# Patient Record
Sex: FEMALE | Race: WHITE | Employment: FULL TIME | URBAN - METROPOLITAN AREA
[De-identification: names, ages, dates, MRNs, and addresses within clinical notes are randomized per-mention and may not be internally consistent; named-entity substitution may affect disease eponyms.]

---

## 2017-04-04 ENCOUNTER — HOSPITAL ENCOUNTER (OUTPATIENT)
Dept: OTHER | Age: 37
Discharge: HOME | End: 2017-04-04
Attending: FAMILY MEDICINE | Admitting: FAMILY MEDICINE

## 2017-04-04 LAB
A/G RATIO: 1.4 (ref 1.1–2.2)
ALBUMIN SERPL-MCNC: 4.3 G/DL (ref 3.4–5)
ALP BLD-CCNC: 62 U/L (ref 40–129)
ALT SERPL-CCNC: 10 U/L (ref 10–40)
ANION GAP SERPL CALCULATED.3IONS-SCNC: 15 MMOL/L (ref 3–16)
AST SERPL-CCNC: 16 U/L (ref 15–37)
BASOPHILS ABSOLUTE: 0.1 K/UL (ref 0–0.2)
BASOPHILS RELATIVE PERCENT: 1.4 %
BILIRUB SERPL-MCNC: <0.2 MG/DL (ref 0–1)
BUN BLDV-MCNC: 6 MG/DL (ref 7–20)
CALCIUM SERPL-MCNC: 9.1 MG/DL (ref 8.3–10.6)
CHLORIDE BLD-SCNC: 102 MMOL/L (ref 99–110)
CO2: 23 MMOL/L (ref 21–32)
CREAT SERPL-MCNC: 0.6 MG/DL (ref 0.6–1.1)
EOSINOPHILS ABSOLUTE: 0.2 K/UL (ref 0–0.6)
EOSINOPHILS RELATIVE PERCENT: 2.3 %
GFR AFRICAN AMERICAN: >60
GFR NON-AFRICAN AMERICAN: >60
GLOBULIN: 3.1 G/DL
GLUCOSE BLD-MCNC: 109 MG/DL (ref 70–99)
HBV SURFACE AB TITR SER: <3.5 MUL/ML
HCT VFR BLD CALC: 42.4 % (ref 36–48)
HEMOGLOBIN: 14 G/DL (ref 12–16)
HEPATITIS B SURFACE ANTIGEN INTERPRETATION: NORMAL
HEPATITIS C ANTIBODY INTERPRETATION: NORMAL
LYMPHOCYTES ABSOLUTE: 2.5 K/UL (ref 1–5.1)
LYMPHOCYTES RELATIVE PERCENT: 24.9 %
MCH RBC QN AUTO: 27.4 PG (ref 26–34)
MCHC RBC AUTO-ENTMCNC: 33 G/DL (ref 31–36)
MCV RBC AUTO: 83 FL (ref 80–100)
MONOCYTES ABSOLUTE: 0.7 K/UL (ref 0–1.3)
MONOCYTES RELATIVE PERCENT: 7 %
NEUTROPHILS ABSOLUTE: 6.4 K/UL (ref 1.7–7.7)
NEUTROPHILS RELATIVE PERCENT: 64.4 %
PDW BLD-RTO: 14.6 % (ref 12.4–15.4)
PLATELET # BLD: 248 K/UL (ref 135–450)
PMV BLD AUTO: 8.8 FL (ref 5–10.5)
POTASSIUM SERPL-SCNC: 3.8 MMOL/L (ref 3.5–5.1)
RBC # BLD: 5.11 M/UL (ref 4–5.2)
SODIUM BLD-SCNC: 140 MMOL/L (ref 136–145)
TOTAL PROTEIN: 7.4 G/DL (ref 6.4–8.2)
TSH SERPL DL<=0.05 MIU/L-ACNC: 1.12 UIU/ML (ref 0.27–4.2)
WBC # BLD: 9.9 K/UL (ref 4–11)

## 2017-04-05 LAB
HIV-1 AND HIV-2 ANTIBODIES: NORMAL
RPR: NORMAL

## 2021-11-28 ENCOUNTER — HOSPITAL ENCOUNTER (EMERGENCY)
Age: 41
Discharge: HOME | End: 2021-11-28
Attending: STUDENT IN AN ORGANIZED HEALTH CARE EDUCATION/TRAINING PROGRAM
Payer: COMMERCIAL

## 2021-11-28 VITALS
SYSTOLIC BLOOD PRESSURE: 151 MMHG | OXYGEN SATURATION: 98 % | HEIGHT: 65 IN | RESPIRATION RATE: 16 BRPM | DIASTOLIC BLOOD PRESSURE: 94 MMHG | WEIGHT: 220 LBS | BODY MASS INDEX: 36.65 KG/M2 | HEART RATE: 75 BPM | TEMPERATURE: 97.5 F

## 2021-11-28 DIAGNOSIS — L08.9 CHRONIC WOUND INFECTION OF ABDOMEN, INITIAL ENCOUNTER: Primary | ICD-10-CM

## 2021-11-28 DIAGNOSIS — S31.109A CHRONIC WOUND INFECTION OF ABDOMEN, INITIAL ENCOUNTER: Primary | ICD-10-CM

## 2021-11-28 PROCEDURE — 99283 EMERGENCY DEPT VISIT LOW MDM: CPT

## 2021-11-28 RX ORDER — ONDANSETRON 4 MG/1
4 TABLET, ORALLY DISINTEGRATING ORAL 3 TIMES DAILY PRN
Qty: 21 TABLET | Refills: 0 | Status: SHIPPED | OUTPATIENT
Start: 2021-11-28

## 2021-11-28 RX ORDER — CEPHALEXIN 250 MG/1
500 CAPSULE ORAL 4 TIMES DAILY
Qty: 56 CAPSULE | Refills: 0 | Status: SHIPPED | OUTPATIENT
Start: 2021-11-28 | End: 2021-12-05

## 2021-11-28 NOTE — Clinical Note
Magan Garcia was seen and treated in our emergency department on 11/28/2021. She may return to work on 11/29/2021. If you have any questions or concerns, please don't hesitate to call.       Marvin Swift, DO

## 2021-11-28 NOTE — ED PROVIDER NOTES
[address] ED      CHIEF COMPLAINT  Wound Check (patient reports having a small opening on her  scar. )       HISTORY OF PRESENT ILLNESS  Jill Mendoza is a 37 y.o. female  who presents to the ED complaining of opening of her  scar. Patient states that she had an opening of the scar a couple weeks ago and was going to follow with wound care, however it closed up and she never followed up. She was diagnosed with a UTI last week and is currently taking Macrobid. She states over the last few days the  scar (last operation 14 years ago) has opened up again. She believes there was some drainage from it this morning. She has minimal pain in the area. Has not had a fever since last week. She has had some nausea and an episode of emesis yesterday. No chest pain, no shortness of breath no abdominal pain. No other complaints, modifying factors or associated symptoms. I have reviewed the following from the nursing documentation. Past Medical History:   Diagnosis Date    Anxiety     GERD (gastroesophageal reflux disease)     Leg pain, bilateral     Migraine     Overweight      Past Surgical History:   Procedure Laterality Date     SECTION      x 5    OTHER SURGICAL HISTORY  2014    laparoscopic cholecystectomy    TUBAL LIGATION      WISDOM TOOTH EXTRACTION       No family history on file.   Social History     Socioeconomic History    Marital status:      Spouse name: Holly Esvin Number of children: 11    Years of education: Not on file    Highest education level: Not on file   Occupational History    Not on file   Tobacco Use    Smoking status: Current Every Day Smoker     Packs/day: 1.00     Years: 19.00     Pack years: 19.00     Types: Cigarettes    Smokeless tobacco: Never Used   Substance and Sexual Activity    Alcohol use: Yes     Comment: occasional    Drug use: No    Sexual activity: Yes     Partners: Male     Comment: no birthcontrol   Other Topics Concern    Not on file   Social History Narrative    Not on file     Social Determinants of Health     Financial Resource Strain:     Difficulty of Paying Living Expenses: Not on file   Food Insecurity:     Worried About Running Out of Food in the Last Year: Not on file    Jacey of Food in the Last Year: Not on file   Transportation Needs:     Lack of Transportation (Medical): Not on file    Lack of Transportation (Non-Medical): Not on file   Physical Activity:     Days of Exercise per Week: Not on file    Minutes of Exercise per Session: Not on file   Stress:     Feeling of Stress : Not on file   Social Connections:     Frequency of Communication with Friends and Family: Not on file    Frequency of Social Gatherings with Friends and Family: Not on file    Attends Mormonism Services: Not on file    Active Member of [address] [place] [address] [employer] or Organizations: Not on file    Attends Club or Organization Meetings: Not on file    Marital Status: Not on file   Intimate Partner Violence:     Fear of Current or Ex-Partner: Not on file    Emotionally Abused: Not on file    Physically Abused: Not on file    Sexually Abused: Not on file   Housing Stability:     Unable to Pay for Housing in the Last Year: Not on file    Number of [place] in the Last Year: Not on file    Unstable Housing in the Last Year: Not on file     No current facility-administered medications for this encounter.      Current Outpatient Medications   Medication Sig Dispense Refill    cephALEXin (KEFLEX) 250 MG capsule Take 2 capsules by mouth 4 times daily for 7 days 56 capsule 0    ondansetron (ZOFRAN-ODT) 4 MG disintegrating tablet Take 1 tablet by mouth 3 times daily as needed for Nausea or Vomiting 21 tablet 0    sulfamethoxazole-trimethoprim (BACTRIM DS) 800-160 MG per tablet Take 1 tablet by mouth 2 times daily      HYDROcodone-acetaminophen (NORCO) 5-325 MG per tablet Take 1 tablet by mouth every 6 hours as needed for Pain 6 tablet 0     Allergies   Allergen Reactions    Toradol [Ketorolac Tromethamine] Other (See Comments)     \"restless legs\"       REVIEW OF SYSTEMS  10 systems reviewed, pertinent positives per HPI otherwise noted to be negative. PHYSICAL EXAM  BP (!) 151/94   Pulse 78   Temp 97.5 °F (36.4 °C) (Temporal)   Resp 18   Ht 5' 5\" (1.651 m)   Wt 220 lb (99.8 kg)   SpO2 98%   BMI 36.61 kg/m²    General: Appears well. Alert  HEENT: Head atraumatic, Eyes normal inspection, PERRL. Normal ENT inspection, Pharynx normal. No signs of dehydration  NECK: Normal inspection  RESPIRATORY: Normal breath sounds. No chest wall tenderness. No respiratory distress  CVS: Heart rate and rhythm regular. No Murmurs  ABDOMEN/GI: Soft, Non-tender, No distention. Lower abdominal horizontal  scar with 2 mm annular opening on the left lateral aspect. There is granulation tissue within the wound, no active bleeding, no drainage, minimal tenderness. Mild erythema. BACK: Normal inspection  EXTREMITIES: Non-Tender. Full ROM. Normal appearance. No Pedal edema  NEURO: Alert and oriented. Sensation normal. Motor normal  PSYCH: Mood normal. Affect normal.  SKIN: Color normal. No rash. Warm, Dry    ED COURSE/MDM  Patient seen and evaluated. Old records reviewed. Labs and imaging reviewed and results discussed with patient. Presented with opening of chronic scar. No signs of severe wound infection, however due to surrounding erythema, will cover with Keflex. Currently on Macrobid for UTI, which we will stop in favor of Keflex to cover both urine and possible skin structure infection. She is given strict return precautions for fevers, severe pain, nausea and vomiting not respond to Zofran, or other concerning symptoms. She will follow with her PCP and wound care. During the patient's ED course, the patient was given:  Medications - No data to display     CLINICAL IMPRESSION  1.  Chronic wound infection of abdomen, initial encounter        Blood pressure (!) 151/94, pulse 78, temperature 97.5 °F (36.4 °C), temperature source Temporal, resp. rate 18, height 5' 5\" (1.651 m), weight 220 lb (99.8 kg), SpO2 98 %, not currently breastfeeding. Ladlynette Culver was discharged to home in good condition. Patient was given scripts for the following medications. I counseled patient how to take these medications. New Prescriptions    CEPHALEXIN (KEFLEX) 250 MG CAPSULE    Take 2 capsules by mouth 4 times daily for 7 days    ONDANSETRON (ZOFRAN-ODT) 4 MG DISINTEGRATING TABLET    Take 1 tablet by mouth 3 times daily as needed for Nausea or Vomiting       Follow-up with:  Lilia Guillen MD  [address] [place] [address] [address] [facility]  [phone]    In 3 days  As needed      DISCLAIMER: This chart was created using Dragon dictation software. Efforts were made by me to ensure accuracy, however some errors may be present due to limitations of this technology and occasionally words are not transcribed correctly.        Moses Centeno DO  11/28/21 [id number]

## 2022-05-10 ENCOUNTER — HOSPITAL ENCOUNTER (EMERGENCY)
Age: 42
Discharge: HOME | End: 2022-05-10
Attending: EMERGENCY MEDICINE
Payer: COMMERCIAL

## 2022-05-10 VITALS
WEIGHT: 250 LBS | TEMPERATURE: 97.1 F | OXYGEN SATURATION: 98 % | HEART RATE: 76 BPM | BODY MASS INDEX: 41.65 KG/M2 | SYSTOLIC BLOOD PRESSURE: 118 MMHG | HEIGHT: 65 IN | DIASTOLIC BLOOD PRESSURE: 81 MMHG | RESPIRATION RATE: 16 BRPM

## 2022-05-10 DIAGNOSIS — L50.9 URTICARIA: Primary | ICD-10-CM

## 2022-05-10 PROCEDURE — 96375 TX/PRO/DX INJ NEW DRUG ADDON: CPT

## 2022-05-10 PROCEDURE — 96374 THER/PROPH/DIAG INJ IV PUSH: CPT

## 2022-05-10 PROCEDURE — 2580000003 HC RX 258: Performed by: EMERGENCY MEDICINE

## 2022-05-10 PROCEDURE — 2500000003 HC RX 250 WO HCPCS: Performed by: EMERGENCY MEDICINE

## 2022-05-10 PROCEDURE — 6360000002 HC RX W HCPCS: Performed by: EMERGENCY MEDICINE

## 2022-05-10 PROCEDURE — 99284 EMERGENCY DEPT VISIT MOD MDM: CPT

## 2022-05-10 RX ORDER — FLUTICASONE PROPIONATE AND SALMETEROL 50; 250 UG/1; UG/1
POWDER RESPIRATORY (INHALATION)
COMMUNITY
Start: 2022-05-09

## 2022-05-10 RX ORDER — CYCLOBENZAPRINE HCL 5 MG
TABLET ORAL
COMMUNITY
Start: 2022-05-09

## 2022-05-10 RX ORDER — METHYLPREDNISOLONE 4 MG/1
TABLET ORAL
Qty: 1 KIT | Refills: 0 | Status: SHIPPED | OUTPATIENT
Start: 2022-05-10 | End: 2022-05-16

## 2022-05-10 RX ORDER — FAMOTIDINE 20 MG/1
20 TABLET, FILM COATED ORAL 2 TIMES DAILY
Qty: 14 TABLET | Refills: 0 | Status: SHIPPED | OUTPATIENT
Start: 2022-05-10 | End: 2022-05-17

## 2022-05-10 RX ORDER — BUPROPION HYDROCHLORIDE 150 MG/1
TABLET ORAL
COMMUNITY
Start: 2022-05-09

## 2022-05-10 RX ORDER — METHYLPREDNISOLONE SODIUM SUCCINATE 125 MG/2ML
125 INJECTION, POWDER, LYOPHILIZED, FOR SOLUTION INTRAMUSCULAR; INTRAVENOUS ONCE
Status: COMPLETED | OUTPATIENT
Start: 2022-05-10 | End: 2022-05-10

## 2022-05-10 RX ORDER — NICOTINE 21 MG/24HR
PATCH, TRANSDERMAL 24 HOURS TRANSDERMAL
COMMUNITY
Start: 2022-05-09

## 2022-05-10 RX ADMIN — METHYLPREDNISOLONE SODIUM SUCCINATE 125 MG: 125 INJECTION, POWDER, FOR SOLUTION INTRAMUSCULAR; INTRAVENOUS at 10:47

## 2022-05-10 RX ADMIN — FAMOTIDINE 20 MG: 10 INJECTION INTRAVENOUS at 10:47

## 2022-05-10 ASSESSMENT — PAIN - FUNCTIONAL ASSESSMENT: PAIN_FUNCTIONAL_ASSESSMENT: NONE - DENIES PAIN

## 2022-05-10 NOTE — Clinical Note
Larisa Haque was seen and treated in our emergency department on 5/10/2022. She may return to work on 05/11/2022. If you have any questions or concerns, please don't hesitate to call.       Rom Pierce MD

## 2022-05-10 NOTE — ED NOTES
Patient discharged in stable, ambulatory condition with all documented belongings. This nurse reviewed discharge instructions, pt verbalized understanding.        Umesh Guillen RN  05/10/22 [id number]

## 2022-05-13 ENCOUNTER — HOSPITAL ENCOUNTER (EMERGENCY)
Age: 42
Discharge: HOME | End: 2022-05-13
Attending: EMERGENCY MEDICINE
Payer: COMMERCIAL

## 2022-05-13 VITALS
BODY MASS INDEX: 42.68 KG/M2 | DIASTOLIC BLOOD PRESSURE: 85 MMHG | TEMPERATURE: 97.2 F | SYSTOLIC BLOOD PRESSURE: 135 MMHG | WEIGHT: 250 LBS | OXYGEN SATURATION: 99 % | HEIGHT: 64 IN | RESPIRATION RATE: 18 BRPM | HEART RATE: 95 BPM

## 2022-05-13 DIAGNOSIS — L50.9 URTICARIA: Primary | ICD-10-CM

## 2022-05-13 PROCEDURE — 99283 EMERGENCY DEPT VISIT LOW MDM: CPT

## 2022-05-13 RX ORDER — EPINEPHRINE 0.3 MG/.3ML
0.3 INJECTION SUBCUTANEOUS ONCE
Qty: 0.3 ML | Refills: 0 | Status: SHIPPED | OUTPATIENT
Start: 2022-05-13 | End: 2022-05-13

## 2022-05-13 RX ORDER — CAMPHOR 0.45 %
10 GEL (GRAM) TOPICAL 3 TIMES DAILY
Qty: 118 ML | Refills: 0 | Status: SHIPPED | OUTPATIENT
Start: 2022-05-13

## 2022-05-13 ASSESSMENT — PAIN DESCRIPTION - DESCRIPTORS: DESCRIPTORS: PRESSURE

## 2022-05-13 ASSESSMENT — PAIN DESCRIPTION - PAIN TYPE: TYPE: ACUTE PAIN

## 2022-05-13 ASSESSMENT — PAIN DESCRIPTION - FREQUENCY: FREQUENCY: CONTINUOUS

## 2022-05-13 ASSESSMENT — PAIN SCALES - GENERAL: PAINLEVEL_OUTOF10: 4

## 2022-05-13 ASSESSMENT — PAIN DESCRIPTION - LOCATION: LOCATION: NECK

## 2022-05-13 NOTE — Clinical Note
Gracia Trevizo was seen and treated in our emergency department on 5/13/2022. She may return to work on 05/14/2022. If you have any questions or concerns, please don't hesitate to call.       Pebbles Dacosta MD

## 2022-05-13 NOTE — ED PROVIDER NOTES
CHIEF COMPLAINT  Urticaria (Pt. tates she has had hives since Tuesday. Pt. has been seen by her PCP. )      HISTORY OF PRESENT ILLNESS  Roosevelt Crespo is a 37 y.o. female with a history of migraines, obesity, GERD presents emerged department for evaluation of rash, urticaria. She states that she was recently seen in the emergency department was started on steroids. She states that she was also seen by her primary care doctor yesterday who increased the steroid dose however she has had persistent urticaria. The rash involves the extremities, trunk, back and are very itchy. She has been taking the steroids as directed as well as antihistamines. She denies any difficulty breathing, throat closing. Denies prior issue with hives or known allergies or exposures. Past Medical History:   Diagnosis Date    Anxiety     GERD (gastroesophageal reflux disease)     Leg pain, bilateral     Migraine     Overweight      Past Surgical History:   Procedure Laterality Date     SECTION      x 5    OTHER SURGICAL HISTORY  2014    laparoscopic cholecystectomy    TUBAL LIGATION      WISDOM TOOTH EXTRACTION       No family history on file.   Social History     Socioeconomic History    Marital status:      Spouse name: Juan Jose Xiao Number of children: 11    Years of education: Not on file    Highest education level: Not on file   Occupational History    Not on file   Tobacco Use    Smoking status: Current Every Day Smoker     Packs/day: 1.50     Years: 19.00     Pack years: 28.50     Types: Cigarettes    Smokeless tobacco: Never Used   Substance and Sexual Activity    Alcohol use: Not Currently     Comment: occasional    Drug use: No    Sexual activity: Yes     Partners: Male     Comment: no birthcontrol   Other Topics Concern    Not on file   Social History Narrative    Not on file     Social Determinants of Health     Financial Resource Strain:     Difficulty of Paying Living Expenses: Not on file   Food Insecurity:     Worried About Running Out of Food in the Last Year: Not on file    Jacey of Food in the Last Year: Not on file   Transportation Needs:     Lack of Transportation (Medical): Not on file    Lack of Transportation (Non-Medical): Not on file   Physical Activity:     Days of Exercise per Week: Not on file    Minutes of Exercise per Session: Not on file   Stress:     Feeling of Stress : Not on file   Social Connections:     Frequency of Communication with Friends and Family: Not on file    Frequency of Social Gatherings with Friends and Family: Not on file    Attends Hinduism Services: Not on file    Active Member of [address] [place] [address] [employer] or Organizations: Not on file    Attends Club or Organization Meetings: Not on file    Marital Status: Not on file   Intimate Partner Violence:     Fear of Current or Ex-Partner: Not on file    Emotionally Abused: Not on file    Physically Abused: Not on file    Sexually Abused: Not on file   Housing Stability:     Unable to Pay for Housing in the Last Year: Not on file    Number of [place] in the Last Year: Not on file    Unstable Housing in the Last Year: Not on file     No current facility-administered medications for this encounter. Current Outpatient Medications   Medication Sig Dispense Refill    diphenhydrAMINE HCl, TOPICAL, (BENADRYL ITCH STOPPING) 2 % GEL Apply 10 mLs topically in the morning, at noon, and at bedtime 118 mL 0    EPINEPHrine (EPIPEN 2-LARRY) 0.3 MG/0.3ML SOAJ injection Inject 0.3 mLs into the muscle once for 1 dose Use as directed for allergic reaction 0.3 mL 0    nicotine (NICODERM CQ) 21 MG/24HR       VENTOLIN  (90 Base) MCG/ACT inhaler       buPROPion (WELLBUTRIN XL) 150 MG extended release tablet       cyclobenzaprine (FLEXERIL) 5 MG tablet       ADVAIR DISKUS 250-50 MCG/ACT AEPB diskus inhaler       methylPREDNISolone (MEDROL DOSEPACK) 4 MG tablet Take by mouth.  1 kit 0    famotidine (PEPCID) 20 MG tablet Take 1 tablet by mouth 2 times daily for 7 days 14 tablet 0    ondansetron (ZOFRAN-ODT) 4 MG disintegrating tablet Take 1 tablet by mouth 3 times daily as needed for Nausea or Vomiting 21 tablet 0    sulfamethoxazole-trimethoprim (BACTRIM DS) 800-160 MG per tablet Take 1 tablet by mouth 2 times daily      HYDROcodone-acetaminophen (NORCO) 5-325 MG per tablet Take 1 tablet by mouth every 6 hours as needed for Pain 6 tablet 0     Allergies   Allergen Reactions    Toradol [Ketorolac Tromethamine] Other (See Comments)     \"restless legs\"       REVIEW OF SYSTEMS  Positive and pertinent negatives as per HPI. All other systems were reviewed and are negative. PHYSICAL EXAM  /85   Pulse 95   Temp 97.2 °F (36.2 °C) (Tympanic)   Resp 18   Ht 5' 4\" (1.626 m)   Wt 250 lb (113.4 kg)   LMP 04/15/2022 (Approximate)   SpO2 99%   BMI 42.91 kg/m²   GENERAL APPEARANCE: Awake and alert. Cooperative. HEAD: Normocephalic. Atraumatic. There is no significant posterior oropharyngeal swelling. LUNGS: Respirations unlabored without accessory muscle use. CTAB. Good air exchange. No wheezes, rales, or rhonchi. Speaking comfortably in full sentences. ABDOMEN: Soft. Non-distended. Non-tender. No guarding or rebound. EXTREMITIES: No peripheral edema. No acute deformities. SKIN: Warm and dry scattered hives the bilateral upper extremities, trunk. NEUROLOGICAL: Alert and oriented X 3. No focal deficit  LABS  I have reviewed all labs for this visit. No results found for this visit on 05/13/22. RADIOLOGY  X-RAYS:  I have reviewed radiologic plain film image(s). ALL OTHER NON-PLAIN FILM IMAGES SUCH AS CT, ULTRASOUND AND MRI HAVE BEEN READ BY THE RADIOLOGIST. No orders to display          No results found. ED COURSE/MDM  Patient seen and evaluated. Old records reviewed. Labs and imaging reviewed and results discussed with patient.      41-year-old female presenting for persistent urticaria despite related to that system including errors in grammar, punctuation, and spelling, as well as words and phrases that may be inappropriate. If there are any questions or concerns please feel free to contact the dictating provider for clarification.      Yariel Reyes MD  05/13/22 [id number]

## 2022-06-06 ENCOUNTER — HOSPITAL ENCOUNTER (OUTPATIENT)
Dept: PULMONOLOGY | Age: 42
Discharge: HOME | End: 2022-06-06
Payer: COMMERCIAL

## 2022-06-06 VITALS — OXYGEN SATURATION: 99 %

## 2022-06-06 DIAGNOSIS — R06.2 WHEEZING ON EXPIRATION: ICD-10-CM

## 2022-06-06 LAB
EXPIRATORY TIME-POST: NORMAL
EXPIRATORY TIME: NORMAL
FEF 25-75% %CHNG: NORMAL
FEF 25-75% %PRED-POST: NORMAL
FEF 25-75% %PRED-PRE: NORMAL
FEF 25-75% PRED: NORMAL
FEF 25-75%-POST: NORMAL
FEF 25-75%-PRE: NORMAL
FEV1 %PRED-POST: 95 %
FEV1 %PRED-PRE: 92 %
FEV1 PRED: NORMAL
FEV1-POST: NORMAL
FEV1-PRE: NORMAL
FEV1/FVC %PRED-POST: NORMAL
FEV1/FVC %PRED-PRE: NORMAL
FEV1/FVC PRED: NORMAL
FEV1/FVC-POST: 82 %
FEV1/FVC-PRE: 81 %
FVC %PRED-POST: NORMAL
FVC %PRED-PRE: NORMAL
FVC PRED: NORMAL
FVC-POST: NORMAL
FVC-PRE: NORMAL
PEF %PRED-POST: NORMAL
PEF %PRED-PRE: NORMAL
PEF PRED: NORMAL
PEF%CHNG: NORMAL
PEF-POST: NORMAL
PEF-PRE: NORMAL

## 2022-06-06 PROCEDURE — 94760 N-INVAS EAR/PLS OXIMETRY 1: CPT

## 2022-06-06 PROCEDURE — 94060 EVALUATION OF WHEEZING: CPT

## 2022-06-06 PROCEDURE — 6370000000 HC RX 637 (ALT 250 FOR IP): Performed by: NURSE PRACTITIONER

## 2022-06-06 PROCEDURE — 94640 AIRWAY INHALATION TREATMENT: CPT

## 2022-06-06 RX ORDER — ALBUTEROL SULFATE 90 UG/1
4 AEROSOL, METERED RESPIRATORY (INHALATION) ONCE
Status: COMPLETED | OUTPATIENT
Start: 2022-06-06 | End: 2022-06-06

## 2022-06-06 RX ADMIN — Medication 4 PUFF: at 08:02

## 2022-06-06 ASSESSMENT — PULMONARY FUNCTION TESTS
FEV1/FVC_POST: 82
FEV1/FVC_PRE: 81
FEV1_PERCENT_PREDICTED_PRE: 92
FEV1_PERCENT_PREDICTED_POST: 95

## 2022-06-07 NOTE — PROCEDURES
[address]                 [address] [place] [address]                               PULMONARY FUNCTION    PATIENT NAME: Ambrosio Prajapati                    :        1980  MED REC NO:   [id number]                          ROOM:  ACCOUNT NO:   [de-identified]                           ADMIT DATE: 2022  PROVIDER:     Desmond Dewitt MD    DATE OF PROCEDURE:  2022    FINDINGS:  Spirometry: The FEV1 is 2.76 liters, which is 92% of  predicted. FVC is 3.43 liters, which is 93% of predicted. The FEV1/FVC  ratio was normal.  Inhaled bronchodilators were given. There is no  significant improvement. IMPRESSION:  No obstructive lung defect.         Panda William MD    D: 2022 9:47:05       T: 2022 11:08:34     [id number]  Job#: [id number]     Doc#: [id number]    CC:

## 2022-09-14 ENCOUNTER — HOSPITAL ENCOUNTER (EMERGENCY)
Age: 42
Discharge: LEFT BEFORE DISCHARGE | End: 2022-09-14
Attending: STUDENT IN AN ORGANIZED HEALTH CARE EDUCATION/TRAINING PROGRAM
Payer: COMMERCIAL

## 2022-09-14 VITALS
TEMPERATURE: 97.2 F | SYSTOLIC BLOOD PRESSURE: 163 MMHG | BODY MASS INDEX: 44.98 KG/M2 | HEIGHT: 65 IN | RESPIRATION RATE: 12 BRPM | DIASTOLIC BLOOD PRESSURE: 98 MMHG | HEART RATE: 81 BPM | WEIGHT: 270 LBS | OXYGEN SATURATION: 99 %

## 2022-09-14 DIAGNOSIS — R10.30 LOWER ABDOMINAL PAIN: Primary | ICD-10-CM

## 2022-09-14 DIAGNOSIS — Z53.29 LEFT AGAINST MEDICAL ADVICE: ICD-10-CM

## 2022-09-14 DIAGNOSIS — R11.0 NAUSEA: ICD-10-CM

## 2022-09-14 PROCEDURE — 99281 EMR DPT VST MAYX REQ PHY/QHP: CPT

## 2022-09-14 NOTE — DISCHARGE INSTRUCTIONS
You declined any work-up here in the emergency department. Please return the emergency department immediately if you have new or worsening symptoms or change your mind about evaluation.
No

## 2022-09-14 NOTE — ED PROVIDER NOTES
[address] ED      CHIEF COMPLAINT  Abdominal pain        HISTORY OF PRESENT ILLNESS  Arron Crowder is a 40 y.o. female with past medical history of GERD, migraine, anxiety, and bile leak who presents to the ED complaining of lower abdominal pain. Onset of pain: 1hr ago, onset at rest  Location: lower quadrants  Radiation: denies  Quality: stabbing  Severity: resolved  Timing: resolved  Palliative Factors: denies  Provocative Factors: denies    Nausea/Vomiting: nausea without vomtiing  Diarrhea/Constipation: constipation; Last BM: today  Vaginal Discharge/Bleeding/: denies Last menses:   Dysuria/urinary frequency: denies  Fever: denies  Previous abdominal surgeries: c section, tubal ligation, cholecystectomy      Old records reviewed: No pertinent information noted. No other complaints, modifying factors or associated symptoms. I have reviewed the following from the nursing documentation. Past Medical History:   Diagnosis Date    Anxiety     GERD (gastroesophageal reflux disease)     Leg pain, bilateral     Migraine     Overweight      Past Surgical History:   Procedure Laterality Date     SECTION      x 5    OTHER SURGICAL HISTORY  2014    laparoscopic cholecystectomy    TUBAL LIGATION      WISDOM TOOTH EXTRACTION       No family history on file.   Social History     Socioeconomic History    Marital status:      Spouse name: Max Madera    Number of children: 5    Years of education: Not on file    Highest education level: Not on file   Occupational History    Not on file   Tobacco Use    Smoking status: Every Day     Packs/day: 1.50     Years: 19.00     Pack years: 28.50     Types: Cigarettes    Smokeless tobacco: Never   Substance and Sexual Activity    Alcohol use: Not Currently     Comment: occasional    Drug use: No    Sexual activity: Yes     Partners: Male     Comment: no birthcontrol   Other Topics Concern    Not on file   Social History Narrative    Not on file Social Determinants of Health     Financial Resource Strain: Not on file   Food Insecurity: Not on file   Transportation Needs: Not on file   Physical Activity: Not on file   Stress: Not on file   Social Connections: Not on file   Intimate Partner Violence: Not on file   Housing Stability: Not on file     No current facility-administered medications for this encounter. Current Outpatient Medications   Medication Sig Dispense Refill    diphenhydrAMINE HCl, TOPICAL, (BENADRYL ITCH STOPPING) 2 % GEL Apply 10 mLs topically in the morning, at noon, and at bedtime 118 mL 0    EPINEPHrine (EPIPEN 2-LARRY) 0.3 MG/0.3ML SOAJ injection Inject 0.3 mLs into the muscle once for 1 dose Use as directed for allergic reaction 0.3 mL 0    nicotine (NICODERM CQ) 21 MG/24HR       VENTOLIN  (90 Base) MCG/ACT inhaler       buPROPion (WELLBUTRIN XL) 150 MG extended release tablet       cyclobenzaprine (FLEXERIL) 5 MG tablet       ADVAIR DISKUS 250-50 MCG/ACT AEPB diskus inhaler       famotidine (PEPCID) 20 MG tablet Take 1 tablet by mouth 2 times daily for 7 days 14 tablet 0    ondansetron (ZOFRAN-ODT) 4 MG disintegrating tablet Take 1 tablet by mouth 3 times daily as needed for Nausea or Vomiting 21 tablet 0    sulfamethoxazole-trimethoprim (BACTRIM DS) 800-160 MG per tablet Take 1 tablet by mouth 2 times daily      HYDROcodone-acetaminophen (NORCO) 5-325 MG per tablet Take 1 tablet by mouth every 6 hours as needed for Pain 6 tablet 0     Allergies   Allergen Reactions    Toradol [Ketorolac Tromethamine] Other (See Comments)     \"restless legs\"       REVIEW OF SYSTEMS  All systems reviewed, pertinent positives per HPI otherwise noted to be negative. PHYSICAL EXAM  BP (!) 163/98   Pulse 81   Temp 97.2 °F (36.2 °C) (Oral)   Resp 12   Ht 5' 5\" (1.651 m)   Wt 270 lb (122.5 kg)   SpO2 99%   BMI 44.93 kg/m²    GENERAL APPEARANCE: Awake and alert. Cooperative. no distress. HENT: Normocephalic. Atraumatic.  Mucous membranes are moist  NECK: Supple. Full range of motion without pain or stiffness  EYES: PERRL. EOM's grossly intact. HEART/CHEST: RRR. No murmurs. LUNGS: Respirations unlabored. CTAB. Good air exchange. Speaking comfortably in full sentences. ABDOMEN: mildly tender diffusely. Soft. Non-distended. No masses. No organomegaly. No guarding or rebound. MUSCULOSKELETAL: No extremity edema. Compartments soft. No deformity. No tenderness in the extremities. All extremities neurovascularly intact. SKIN: Warm and dry. No acute rashes. NEUROLOGICAL: Alert and oriented. No gross facial drooping. Strength 5/5, sensation intact. PSYCHIATRIC: Normal mood and affect. LABS  I have reviewed all labs for this visit. No results found for this visit on 09/14/22. RADIOLOGY  No orders to display           ED COURSE / MDM  Patient seen and evaluated. Old records reviewed and pertinent information included in HPI. Labs and imaging reviewed and results discussed with patient. Overall, well appearing patient in no distress, presenting for abdominal pain. Physical exam remarkable for mild diffuse abdominal tenderness. Differential diagnosis includes but is not limited to: Appendicitis, bowel obstruction,  diverticulitis, hernia, UTI, AAA, pregnancy, ectopic pregnancy, ovarian torsion, tubo-ovarian abscess. Lower suspicion for gastroenteritis, peptic ulcer disease, cholecystitis, pancreatitis, hepatitis or other liver disease    During the patient's ED course, the patient was given:  Medications - No data to display     Is this patient to be included in the SEP-1 Core Measure due to severe sepsis or septic shock? No   Exclusion criteria - the patient is NOT to be included for SEP-1 Core Measure due to:  2+ SIRS criteria are not met    On evaluation of the patient, she reports the pain has resolved. On exam she was still mildly diffusely palpation.   I did recommend at least laboratory studies however, patient declines due to patient currently being resolved. Did discuss that given she is  on exam, I do feel that further evaluation is still indicated. Patient continues to decline further evaluation and management. Patient will be discharged [address] I discussed the nature and purpose, risks and benefits, as well as, the alternatives of further evaluation with Jong Montaño. Jong Montaño was given the time and opportunity to ask questions and consider their options, and after the discussion, Jong Montaño decided to refuse. I informed Jong Montaño that refusal could lead to, but was not limited to, death, permanent disability, or severe pain. If present, I asked the relatives or significant others of Jong Montaño to dissuade them without success. Prior to refusing, their nurse and I determined and agreed that Jong Montaño had the capacity to make this decision and understood the consequences of that decision. They appear clinically sober, to be mentating appropriately, free from distracting injury, appear to have intact insight, judgement, and reason. Specifically, they were able to verbally state back in a coherent manner their current medical condition, the proposed course of treatment, and the risks/benfits/ alternatives of treatment verses leaving against medical advice. Jong Montaño signed the refusal of treatment form and their nurse signed the form agreeing that the patient/guardian had received informed consent. After refusal, I made every reasonable opportunity to treat Jong Montaño to the best of my ability. They understand that they may return to seek medical attention here whenever they choose. CLINICAL IMPRESSION  1. Lower abdominal pain    2. Nausea    3. Left against medical advice        Blood pressure (!) 163/98, pulse 81, temperature 97.2 °F (36.2 °C), temperature source Oral, resp.  rate 12, height 5' 5\" (1.651 m), weight 270 lb (122.5 kg), SpO2 99 %, not currently breastfeeding. Luis Delgado was discharged to home [address]      Patient was given scripts for the following medications. I counseled patient how to take these medications. New Prescriptions    No medications on file       Follow-up with:  LITO Barclay CNP  [address] [place] [address] [address]    Call in 1 day      [facility] ED  [address] [facility]  Go to   As needed, If symptoms worsen    DISCLAIMER: This chart was created using Dragon dictation software. Efforts were made by me to ensure accuracy, however some errors may be present due to limitations of this technology and occasionally words are not transcribed correctly.        Donald Li MD  09/15/22 2101

## 2022-09-14 NOTE — ED NOTES
Followed up with Pancho Gordon on 9/14/2022 at 5:56 PM. Patient left the ED with a disposition of AMA on . Patient cited \"I feel better and silly for coming in\" as reason. Advised patient to follow up with a primary care physician or return to the Emergency Department if symptoms worsen. AMA form signed by Karoline Galvez and Dr. Myra Hurley.    Lea Lo, ALETHA Lo RN  09/14/22 532 [address] [place] [address], RN  09/14/22 1818

## 2022-09-23 ENCOUNTER — TELEPHONE (OUTPATIENT)
Dept: BARIATRICS/WEIGHT MGMT | Age: 42
End: 2022-09-23

## 2022-09-23 NOTE — TELEPHONE ENCOUNTER
Patient was sent Dr. Austen Quinones digital bariatric seminar. Patient DOES NOT have BWLS coverage with Primary [facility] (Plan Exclusion)    Patient DOES have coverage with [facility]. Bariatric benefit form scanned in media.     *Spoke with pt, Info given  No HX of WLS, BMI > 35  Pk mailed

## 2022-10-10 ENCOUNTER — HOSPITAL ENCOUNTER (OUTPATIENT)
Dept: VASCULAR LAB | Age: 42
Discharge: HOME | End: 2022-10-10
Payer: COMMERCIAL

## 2022-10-10 DIAGNOSIS — L81.9 DISCOLORATION OF SKIN: ICD-10-CM

## 2022-10-10 PROCEDURE — 93926 LOWER EXTREMITY STUDY: CPT

## 2022-10-27 ENCOUNTER — OFFICE VISIT (OUTPATIENT)
Dept: BARIATRICS/WEIGHT MGMT | Age: 42
End: 2022-10-27
Payer: COMMERCIAL

## 2022-10-27 VITALS
BODY MASS INDEX: 45.22 KG/M2 | DIASTOLIC BLOOD PRESSURE: 82 MMHG | OXYGEN SATURATION: 95 % | SYSTOLIC BLOOD PRESSURE: 130 MMHG | HEIGHT: 65 IN | HEART RATE: 74 BPM | RESPIRATION RATE: 18 BRPM | WEIGHT: 271.4 LBS

## 2022-10-27 DIAGNOSIS — E66.01 MORBID OBESITY WITH BMI OF 45.0-49.9, ADULT (HCC): Primary | ICD-10-CM

## 2022-10-27 DIAGNOSIS — Z01.818 PREOPERATIVE CLEARANCE: ICD-10-CM

## 2022-10-27 DIAGNOSIS — K21.9 CHRONIC GERD: ICD-10-CM

## 2022-10-27 PROCEDURE — 4004F PT TOBACCO SCREEN RCVD TLK: CPT | Performed by: SURGERY

## 2022-10-27 PROCEDURE — G8427 DOCREV CUR MEDS BY ELIG CLIN: HCPCS | Performed by: SURGERY

## 2022-10-27 PROCEDURE — G8417 CALC BMI ABV UP PARAM F/U: HCPCS | Performed by: SURGERY

## 2022-10-27 PROCEDURE — G8484 FLU IMMUNIZE NO ADMIN: HCPCS | Performed by: SURGERY

## 2022-10-27 PROCEDURE — 99205 OFFICE O/P NEW HI 60 MIN: CPT | Performed by: SURGERY

## 2022-10-27 RX ORDER — ACETAMINOPHEN 160 MG
TABLET,DISINTEGRATING ORAL
COMMUNITY

## 2022-10-27 RX ORDER — M-VIT,TX,IRON,MINS/CALC/FOLIC 27MG-0.4MG
1 TABLET ORAL DAILY
COMMUNITY

## 2022-10-27 NOTE — PROGRESS NOTES
[facility]) Physicians   [facility] [employer]  Med Treviño MD, [facility] [address], [address], [address] [facility]    [place] [address] . Phone: [phone]  Fax: [phone]       Chief Complaint   Patient presents with    Bariatric, Initial Visit     NP RELL Lopez           HPI:    Tete Elias is a very pleasant 40 y.o. obese female ,   Body mass index is 45.16 kg/m². And multiple medical problems who is presenting for weight loss surgery evaluation and consultation by Dr. Norm De Jesus. Patient has been struggling for several years now with obesity. Patient feels the weight is an obstacle to achieve and perform things in daily living as well risk on health. Tries to diet, and exercise but can't keep the weight off. Patient tried Atkins Diet, Weight Watcher Anonymous, low fat, low carb and calorie restriction. Patient has participated in meal replacement/liquid diets. Slimfast  Patient has not participated in weight loss medications and other regimens, but with no sustainable weight loss. Patient  is very determined to lose weight and be healthy, and is interested in surgical weight loss for future weight loss. .    Otherwise patient denies any nausea, vomiting, fevers, chills, shortness of breath, chest pain, constipation or urinary symptoms.         Obesity related problems [facility] is dealing with:  Patient Active Problem List   Diagnosis    Bile leak    Hypoxemia    Pleural effusion    Chronic GERD    Preoperative clearance    Morbid obesity with BMI of 45.0-49.9, adult ([facility])           Pain Assessment   Denies any abdominal pain     Past Medical History:   Diagnosis Date    Anxiety     GERD (gastroesophageal reflux disease)     Leg pain, bilateral     Migraine     Overweight      Past Surgical History:   Procedure Laterality Date     SECTION      x 5    OTHER SURGICAL HISTORY  2014    laparoscopic cholecystectomy    TUBAL LIGATION      WISDOM TOOTH EXTRACTION History reviewed. No pertinent family history. Social History     Tobacco Use    Smoking status: Every Day     Packs/day: 1.50     Years: 19.00     Pack years: 28.50     Types: Cigarettes    Smokeless tobacco: Never    Tobacco comments:     Down to few cigs per day starting 4/1/2022   Substance Use Topics    Alcohol use: Not Currently     Comment: occasional         I counseled the patient on the risks of Smoking, ETOH or Drug use, and importance of completely avoiding them, otherwise patient risks surgery cancellation or post operative serious complications if they start using any. Cristobal Vasquez acknowledged, agreed not to use and wants to proceed. Allergies   Allergen Reactions    Toradol [Ketorolac Tromethamine] Other (See Comments)     \"restless legs\"     Vitals:    10/27/22 1056   BP: 130/82   Pulse: 74   Resp: 18   SpO2: 95%   Weight: 271 lb 6.4 oz (123.1 kg)   Height: 5' 5\" (1.651 m)       Body mass index is 45.16 kg/m².       Current Outpatient Medications:     Multiple Vitamins-Minerals (THERAPEUTIC MULTIVITAMIN-MINERALS) tablet, Take 1 tablet by mouth daily, Disp: , Rfl:     Cholecalciferol (VITAMIN D3) 50 MCG (2000 UT) CAPS, Take by mouth, Disp: , Rfl:     Buprenorphine HCl (SUBUTEX SL), Place under the tongue, Disp: , Rfl:     nicotine (NICODERM CQ) 21 MG/24HR, , Disp: , Rfl:     VENTOLIN  (90 Base) MCG/ACT inhaler, , Disp: , Rfl:     buPROPion (WELLBUTRIN XL) 150 MG extended release tablet, , Disp: , Rfl:     cyclobenzaprine (FLEXERIL) 5 MG tablet, , Disp: , Rfl:     ADVAIR DISKUS 250-50 MCG/ACT AEPB diskus inhaler, , Disp: , Rfl:     diphenhydrAMINE HCl, TOPICAL, (BENADRYL ITCH STOPPING) 2 % GEL, Apply 10 mLs topically in the morning, at noon, and at bedtime, Disp: 118 mL, Rfl: 0    EPINEPHrine (EPIPEN 2-LARRY) 0.3 MG/0.3ML SOAJ injection, Inject 0.3 mLs into the muscle once for 1 dose Use as directed for allergic reaction, Disp: 0.3 mL, Rfl: 0    famotidine (PEPCID) 20 MG tablet, Take 1 tablet by mouth 2 times daily for 7 days, Disp: 14 tablet, Rfl: 0    ondansetron (ZOFRAN-ODT) 4 MG disintegrating tablet, Take 1 tablet by mouth 3 times daily as needed for Nausea or Vomiting (Patient not taking: Reported on 10/27/2022), Disp: 21 tablet, Rfl: 0    sulfamethoxazole-trimethoprim (BACTRIM DS) 800-160 MG per tablet, Take 1 tablet by mouth 2 times daily (Patient not taking: Reported on 10/27/2022), Disp: , Rfl:     HYDROcodone-acetaminophen ([place] Fess) 5-325 MG per tablet, Take 1 tablet by mouth every 6 hours as needed for Pain (Patient not taking: Reported on 10/27/2022), Disp: 6 tablet, Rfl: 0      Review of Systems - History obtained from the patient  General ROS: overweight   Psychological ROS: negative  Ophthalmic ROS: negative  Neurological ROS: negative  ENT ROS: negative  Allergy and Immunology ROS: negative  Hematological and Lymphatic ROS: negative  Endocrine ROS: overweight  Breast ROS: negative  Respiratory ROS: negative  Cardiovascular ROS: negative  Gastrointestinal ROS:negative  Genito-Urinary ROS: negative  Musculoskeletal ROS: weight effects on joints  Skin ROS: negative    Physical Exam   Constitutional: Patient is oriented to person, place, and time. Vital signs are normal. Patient  appears well-developed and well-nourished. Patient  is active and cooperative. Non-toxic appearance. No distress. HENT:   Head: Normocephalic and atraumatic. Head is without laceration. Right Ear: External ear normal. No lacerations. No drainage, swelling or tenderness. Left Ear: External ear normal. No lacerations. No drainage, swelling or tenderness. Nose/Mouth/Throat: Patient is wearing mask due to Covid-19 pandemic precautions, following CDC and health authorities guidelines. Eyes: Conjunctivae, EOM and lids are normal. Pupils are equal, round, and reactive to light. Right eye exhibits no discharge. No foreign body present in the right eye. Left eye exhibits no discharge.  No foreign body present in the left eye. No scleral icterus. Neck: Trachea normal and normal range of motion. Neck supple. No JVD present. No tracheal tenderness present. Carotid bruit is not present. No rigidity. No tracheal deviation and no edema present. No thyromegaly present. Cardiovascular: Normal rate, regular rhythm, normal heart sounds, intact distal pulses and normal pulses. Pulmonary/Chest: Effort normal and breath sounds normal. No stridor. No respiratory distress. Patient  has no wheezes. Patient has no rales. Patient exhibits no tenderness and no crepitus. Abdominal: Soft. Normal appearance and bowel sounds are normal. Patient exhibits no distension, no abdominal bruit, no ascites and no mass. There is no hepatosplenomegaly. There is no tenderness. There is no rigidity, no rebound, no guarding and no CVA tenderness. No hernia. Hernia confirmed negative in the ventral area. Musculoskeletal: Normal range of motion. Patient exhibits no edema or tenderness. Lymphadenopathy:        Head (right side): No submental, no submandibular, no preauricular, no posterior auricular and no occipital adenopathy present. Head (left side): No submental, no submandibular, no preauricular, no posterior auricular and no occipital adenopathy present. Patient  has no cervical adenopathy. Right: No supraclavicular adenopathy present. Left: No supraclavicular adenopathy present. Neurological: Patient is alert and oriented to person, place, and time. Patient has normal strength. Coordination and gait normal. GCS eye subscore is 4. GCS verbal subscore is 5. GCS motor subscore is 6. Skin: Skin is warm and dry. No abrasion and no rash noted. Patient  is not diaphoretic. No cyanosis or erythema. Psychiatric: Patient has a normal mood and affect. speech is normal and behavior is normal. Cognition and memory are normal.         Luis Weston was seen today for bariatric, initial visit.     Diagnoses and all orders for this visit:    Morbid obesity with BMI of 45.0-49.9, adult ([facility] [address]  -     CBC with Auto Differential; Future  -     Comprehensive Metabolic Panel; Future  -     Hemoglobin A1C; Future  -     Iron and TIBC; Future  -     Lipid Panel; Future  -     TSH with Reflex; Future  -     Vitamin A; Future  -     Vitamin B1, Whole Blood; Future  -     Vitamin B12 & Folate; Future  -     Vitamin D 25 Hydroxy; Future  -     Vitamin E; Future  -     Protime-INR; Future  -     Ambulatory referral to Cardiology    Chronic GERD  -     CBC with Auto Differential; Future  -     Comprehensive Metabolic Panel; Future  -     Hemoglobin A1C; Future  -     Iron and TIBC; Future  -     Lipid Panel; Future  -     TSH with Reflex; Future  -     Vitamin A; Future  -     Vitamin B1, Whole Blood; Future  -     Vitamin B12 & Folate; Future  -     Vitamin D 25 Hydroxy; Future  -     Vitamin E; Future  -     Protime-INR; Future  -     Ambulatory referral to Cardiology    Preoperative clearance  -     CBC with Auto Differential; Future  -     Comprehensive Metabolic Panel; Future  -     Hemoglobin A1C; Future  -     Iron and TIBC; Future  -     Lipid Panel; Future  -     TSH with Reflex; Future  -     Vitamin A; Future  -     Vitamin B1, Whole Blood; Future  -     Vitamin B12 & Folate; Future  -     Vitamin D 25 Hydroxy; Future  -     Vitamin E; Future  -     Protime-INR; Future  -     Ambulatory referral to Cardiology          A/P  Chay Morel is a very pleasant 40 y.o. female with Obesity,  Body mass index is 45.16 kg/m². and multiple obesity related co-morbidities. Chay Morel is very motivated to lose weight and being more healthy.      We discussed how her weight affects her overall health including:  Patient Active Problem List   Diagnosis    Bile leak    Hypoxemia    Pleural effusion    Chronic GERD    Preoperative clearance    Morbid obesity with BMI of 45.0-49.9, adult [facility])          The patient underwent extensive dietary counseling with the registered dietitian. I have reviewed, discussed and agree with the dietary plan. Medical weight loss and different surgical options were discussed in details with patient. Gale Locke is interested in surgical weight loss for future weight loss. Case volume and outcomes data in our program were discussed and reviewed with the patient. Questions and concerns were addressed today. Patient is interested in Laparoscopic Sleeve Gastrectomy, which I believe is an excellent option. I explained to the patient that surgery does carry a risk specially with the coexisting comorbid conditions the patient have. Surgery as well in obese patiens can carry more risk. Risks including but not limited to; Infection, bleeding, gastric leak or obstruction, persistent nausea, vomiting, or reflux, injury to surrounding structures, risks of anesthesia, stricture, delayed gastric emptying, staple line leak, incisional hernia, malnutrition , vitamin deficiency, neurological complications, paralysis, hair loss, and/ or Conversion to Open surgery may be necessary. Failure to lose or maintain weight loss, Gallstones or Kidney Stones, Deep Venous Thrombosis , pulmonary embolism and / or death. However I do believe the benefits outweighs that risk. Alisha Barnard understands the risks and wants to proceed. We will proceed with pre-operative work up labs and studies. Will also petition patient's  insurance for approval for this procedure. I advised the patient that we can't guarantee final insurance approval.      Patient received dietary handouts and education. Patient advised that its their responsibility to follow up for studies, referrals and/or labs ordered today. Also discussed in details the importance of follow up, as well following the recommendations and completing the whole program to improve outcomes when it comes to healthier lifestyle as well weight loss.    Patient also advised about risks and benefits being on a strict dietary regimen as well using supplements. Patient agrees and wants to proceed with weight loss planning     Today's encounter included any number of the following: Bariatric Pre/Post operative work up/protocols, review of labs, imaging, provider notes, outside hospital records, performing examination/evaluation, counseling patient and/or family, ordering medications/tests, placing referrals and communication with referring physicians, coordination of care; discussing dietary plan/recall with the patient as well with registered dietitian and documentation in the EHR. Of note, the above was done during same day of the actual patient encounter. Obesity as a disease is considered a high risk to patients overall health and should therefore be considered a high risk disease state. Advised the patient that not getting there weight under control (weight loss hopefully will help with resolving/improving of the comorbid conditions),  that could increase risk of complications/worsening of those conditions on the long-term. With Covid-19 pandemic, CDC and health authorities did classify obese patients as vulnerable and high risk as well. Which makes weight loss a priority for improvement of their wellbeing and overall health. CDC has issued the following statement as far Obese patients being at Increased Risk of being critically ill from SARS-Cov-2  \"Severe obesity increases the risk of a serious breathing problem called acute respiratory distress syndrome (ARDS), which is a major complication of UEVKG-28 and can cause difficulties with a doctors ability to provide respiratory support for seriously ill patients. People living with severe obesity can have multiple serious chronic diseases and underlying health conditions that can increase the risk of severe illness from COVID-19. \"      I did explain thoroughly to the patient that compliance with pre- and post op diet and other recommendations are integral part to improve the chances of successful weight loss and also not following it could end with serious health complications. Also stressed to the patient importance of taking the multivitamins as instructed, otherwise risk significant complications. Patient Instructions   Patient received dietary handouts and education. Goals:   -Eat 4-5 times daily  -Avoid high fat and high sugar foods  -Include protein with all meals and snacks  -Avoid carbonation and caffeine  -Avoid calorie containing beverages  -Increase physical activity as tolerated      -Plan for Laparoscopic sleeve gastrectomy      Pre-operative work up Ordered:    - F/U in 4 weeks. - Nutrition Labs. - Protein Shake Trial.  - Psych Evaluation.   - Cardiac Clearance. - EGD (endoscopy to check your stomach). - Support Group Attendance. - Obtain letter of medical necessity (PCP Letter). - Quit Smoking,  Alcohol, Caffeine and Carbonated Drinks  - Obtain records for Weight History 2 yrs. - Start Regular Exercise and track your activities. - Start Tracking your food Intake and follow dietary guidelines. - Avoid Pregnancy for 2 yrs from date of surgery. (for female patients in childbearing age)          Patient advised that its their responsibility to follow up for studies, referrals and/or labs ordered today. Please note that some or all of this report was generated using voice recognition software. Please notify me in case of any questions about the content of this document, as some errors in transcription may have occurred .

## 2022-10-27 NOTE — PATIENT INSTRUCTIONS
Patient received dietary handouts and education. Goals:   -Eat 4-5 times daily  -Avoid high fat and high sugar foods  -Include protein with all meals and snacks  -Avoid carbonation and caffeine  -Avoid calorie containing beverages  -Increase physical activity as tolerated      -Plan for Laparoscopic sleeve gastrectomy      Pre-operative work up Ordered:    - F/U in 4 weeks. - Nutrition Labs. - Protein Shake Trial.  - Psych Evaluation.   - Cardiac Clearance. - EGD (endoscopy to check your stomach). - Support Group Attendance. - Obtain letter of medical necessity (PCP Letter). - Quit Smoking,  Alcohol, Caffeine and Carbonated Drinks  - Obtain records for Weight History 2 yrs. - Start Regular Exercise and track your activities. - Start Tracking your food Intake and follow dietary guidelines. - Avoid Pregnancy for 2 yrs from date of surgery. (for female patients in childbearing age)          Patient advised that its their responsibility to follow up for studies, referrals and/or labs ordered today.

## 2022-10-27 NOTE — PROGRESS NOTES
Cody Keita is a 40 y.o. female with a date of birth of 1980. Vitals:    10/27/22 1056   BP: 130/82   Pulse: 74   Resp: 18   SpO2: 95%    BMI: Body mass index is 45.16 kg/m². Obesity Classification: Class III    Weight History: Wt Readings from Last 3 Encounters:   10/27/22 271 lb 6.4 oz (123.1 kg)   09/14/22 270 lb (122.5 kg)   05/13/22 250 lb (113.4 kg)     Patient's lowest adult weight was 155 lbs at age 25. Patient's highest adult weight was 271 lbs at age 40- current. Has gained 60 lbs in past 3 years since she started working. Patient has participated in the following weight loss programs: Atkins Diet, Weight Watcher Anonymous, low fat, low carb and calorie restriction. Patient has participated in meal replacement/liquid diets. Slimfast  Patient has not participated in weight loss medications. Patient is not lactose intolerant. Patient does not have food allergies. Patient does not have Quaker/cultural food preferences. Patient does tolerate artificial sweeteners. 24 hour recall/food frequency chart: Works as [employer] 7 PM- 7 AM. Usually eats 1 meals/day. Wake up 4:30 PM for work  Breakfast: 6-6:30 PM: chix sandwich + rice 2 eggs + 2 slices toast OR None  Snack: None  Lunch: None  Snack: 1-2 AM: 1 pack PB crackers OR chips   Dinner:  None at end of shift OR Grilled chix breast + 1 cup mashed pots + side salad  Snack: Snack cake/donut OR none  Bed around 8:30-9 AM  Drinks throughout the day: Pepsi 5 cans/day, water, Sweet tea occasionally    Do you drink alcohol? No.    Patient does not meet the criteria for binge eating disorder. Patient does have grazing. Patient does not have night eating. Patient does have a history of emotional eating or eating out of boredom. Surgery  Patient does feel confident in her ability to make these changes. The patient's expectations of post-surgical eating habits realistic.     Patient states she does understand the consequences of not complying with post-op food guidelines. Patient states she does understands the long term changes in food intake that will be necessary for all occasions after surgery for the rest of her life. Patient is deemed nutritionally appropriate to proceed. Goals  Weight: 180 lbs  Health Improvement: be around/ do more with grand kids, prevent DM    Assessment  Nutritional Needs: RMR=(9.99 x 123.1) + (6.25 x 165.1) - (4.92 x 42 y.o.) -161= 1899 kcal x 1.3 (sedentary activity factor)= 2469 kcal - 1000 (for 2 lb weight loss/week)= 1469 kcal.    Plan  Plan/Recommendations: Start presurgical guidelines. Goals:   -Eat 4-5 times daily  -Avoid high fat and high sugar foods  -Include protein with all meals and snacks  -Avoid carbonation and caffeine  -Avoid calorie containing beverages  -Increase physical activity as tolerated    PES Statement:  Overweight/Obesity related to lack of exercise, sedentary lifestyle, unhealthy eating habits, and unsuccessful diet attempts as evidenced by BMI. Body mass index is 45.16 kg/m². Will follow up as necessary.     Frank Easley, RD, LD

## 2022-11-26 PROBLEM — Z01.818 PREOPERATIVE CLEARANCE: Status: RESOLVED | Noted: 2022-10-27 | Resolved: 2022-11-26

## 2022-12-08 ENCOUNTER — TELEPHONE (OUTPATIENT)
Dept: BARIATRICS/WEIGHT MGMT | Age: 42
End: 2022-12-08

## 2022-12-08 ENCOUNTER — OFFICE VISIT (OUTPATIENT)
Dept: BARIATRICS/WEIGHT MGMT | Age: 42
End: 2022-12-08
Payer: COMMERCIAL

## 2022-12-08 VITALS
HEIGHT: 65 IN | WEIGHT: 270 LBS | RESPIRATION RATE: 18 BRPM | SYSTOLIC BLOOD PRESSURE: 138 MMHG | BODY MASS INDEX: 44.98 KG/M2 | OXYGEN SATURATION: 92 % | DIASTOLIC BLOOD PRESSURE: 82 MMHG | HEART RATE: 99 BPM

## 2022-12-08 DIAGNOSIS — E66.01 MORBID OBESITY WITH BMI OF 45.0-49.9, ADULT (HCC): Primary | ICD-10-CM

## 2022-12-08 DIAGNOSIS — K21.9 CHRONIC GERD: ICD-10-CM

## 2022-12-08 PROCEDURE — 4004F PT TOBACCO SCREEN RCVD TLK: CPT | Performed by: SURGERY

## 2022-12-08 PROCEDURE — G8484 FLU IMMUNIZE NO ADMIN: HCPCS | Performed by: SURGERY

## 2022-12-08 PROCEDURE — G8417 CALC BMI ABV UP PARAM F/U: HCPCS | Performed by: SURGERY

## 2022-12-08 PROCEDURE — 99214 OFFICE O/P EST MOD 30 MIN: CPT | Performed by: SURGERY

## 2022-12-08 PROCEDURE — G8427 DOCREV CUR MEDS BY ELIG CLIN: HCPCS | Performed by: SURGERY

## 2022-12-08 NOTE — PROGRESS NOTES
Resp: 18   SpO2: 92%   Weight: 270 lb (122.5 kg)   Height: 5' 5\" (1.651 m)       Body mass index is 44.93 kg/m².     Lab Results   Component Value Date/Time    WBC 9.9 04/04/2017 10:58 AM    RBC 5.11 04/04/2017 10:58 AM    HGB 14.0 04/04/2017 10:58 AM    HCT 42.4 04/04/2017 10:58 AM    MCV 83.0 04/04/2017 10:58 AM    MCH 27.4 04/04/2017 10:58 AM    MCHC 33.0 04/04/2017 10:58 AM    MPV 8.8 04/04/2017 10:58 AM    NEUTOPHILPCT 64.4 04/04/2017 10:58 AM    LYMPHOPCT 24.9 04/04/2017 10:58 AM    MONOPCT 7.0 04/04/2017 10:58 AM    EOSRELPCT 2.3 04/04/2017 10:58 AM    BASOPCT 1.4 04/04/2017 10:58 AM    NEUTROABS 6.4 04/04/2017 10:58 AM    LYMPHSABS 2.5 04/04/2017 10:58 AM    MONOSABS 0.7 04/04/2017 10:58 AM    EOSABS 0.2 04/04/2017 10:58 AM     Lab Results   Component Value Date/Time     04/04/2017 10:58 AM    K 3.8 04/04/2017 10:58 AM     04/04/2017 10:58 AM    CO2 23 04/04/2017 10:58 AM    ANIONGAP 15 04/04/2017 10:58 AM    GLUCOSE 109 04/04/2017 10:58 AM    BUN 6 04/04/2017 10:58 AM    CREATININE 0.6 04/04/2017 10:58 AM    LABGLOM >60 04/04/2017 10:58 AM    GFRAA >60 04/04/2017 10:58 AM    GFRAA >60 06/29/2012 06:18 PM    CALCIUM 9.1 04/04/2017 10:58 AM    PROT 7.4 04/04/2017 10:58 AM    PROT 7.8 06/29/2012 06:18 PM    LABALBU 4.3 04/04/2017 10:58 AM    AGRATIO 1.4 04/04/2017 10:58 AM    BILITOT <0.2 04/04/2017 10:58 AM    ALKPHOS 62 04/04/2017 10:58 AM    ALT 10 04/04/2017 10:58 AM    AST 16 04/04/2017 10:58 AM    GLOB 3.1 04/04/2017 10:58 AM     No results found for: CHOL, TRIG, HDL, LDLCALC, LABVLDL  No results found for: TSHREFLEX  No results found for: IRON, TIBC, LABIRON  No results found for: PJPRENGW74, FOLATE  No results found for: VITD25  No results found for: LABA1C, EAG      Current Outpatient Medications:     Multiple Vitamins-Minerals (THERAPEUTIC MULTIVITAMIN-MINERALS) tablet, Take 1 tablet by mouth daily, Disp: , Rfl:     Cholecalciferol (VITAMIN D3) 50 MCG (2000 UT) CAPS, Take by mouth, Disp: , Rfl:     Buprenorphine HCl (SUBUTEX SL), Place under the tongue, Disp: , Rfl:     diphenhydrAMINE HCl, TOPICAL, (BENADRYL ITCH STOPPING) 2 % GEL, Apply 10 mLs topically in the morning, at noon, and at bedtime, Disp: 118 mL, Rfl: 0    nicotine (NICODERM CQ) 21 MG/24HR, , Disp: , Rfl:     VENTOLIN  (90 Base) MCG/ACT inhaler, , Disp: , Rfl:     buPROPion (WELLBUTRIN XL) 150 MG extended release tablet, , Disp: , Rfl:     cyclobenzaprine (FLEXERIL) 5 MG tablet, , Disp: , Rfl:     ADVAIR DISKUS 250-50 MCG/ACT AEPB diskus inhaler, , Disp: , Rfl:     ondansetron (ZOFRAN-ODT) 4 MG disintegrating tablet, Take 1 tablet by mouth 3 times daily as needed for Nausea or Vomiting, Disp: 21 tablet, Rfl: 0    sulfamethoxazole-trimethoprim (BACTRIM DS) 800-160 MG per tablet, Take 1 tablet by mouth 2 times daily, Disp: , Rfl:     HYDROcodone-acetaminophen (NORCO) 5-325 MG per tablet, Take 1 tablet by mouth every 6 hours as needed for Pain, Disp: 6 tablet, Rfl: 0    EPINEPHrine (EPIPEN 2-LARRY) 0.3 MG/0.3ML SOAJ injection, Inject 0.3 mLs into the muscle once for 1 dose Use as directed for allergic reaction, Disp: 0.3 mL, Rfl: 0    famotidine (PEPCID) 20 MG tablet, Take 1 tablet by mouth 2 times daily for 7 days, Disp: 14 tablet, Rfl: 0    Review of Systems - History obtained from the patient  General ROS: negative  Psychological ROS: negative  Ophthalmic ROS: negative  Neurological ROS: negative  ENT ROS: negative  Allergy and Immunology ROS: negative  Hematological and Lymphatic ROS: negative  Endocrine ROS: negative  Breast ROS: negative  Respiratory ROS: negative  Cardiovascular ROS: negative  Gastrointestinal ROS:negative  Genito-Urinary ROS: negative  Musculoskeletal ROS: negative   Skin ROS: negative    Physical Exam   Vitals Reviewed   Constitutional: Patient is oriented to person, place, and time. Patient appears well-developed and well-nourished. Patient is active and cooperative. Non-toxic appearance. No distress. HENT:   Head: Normocephalic and atraumatic. Head is without abrasion and without laceration. Hair is normal.   Right Ear: External ear normal. No lacerations. No drainage, swelling . Left Ear: External ear normal. No lacerations. No drainage, swelling. Nose/Mouth: face mask in place  Eyes: Conjunctivae, EOM and lids are normal. Right eye exhibits no discharge. No foreign body present in the right eye. Left eye exhibits no discharge. No foreign body present in the left eye. No scleral icterus. Neck: Trachea normal and normal range of motion. No JVD present. Pulmonary/Chest: Effort normal. No accessory muscle usage or stridor. No apnea. No respiratory distress. Cardiovascular: Normal rate and no JVD. Abdominal: Normal appearance. Patient exhibits no distension. Abdomen is soft, obese, non tender. Musculoskeletal: Normal range of motion. Patient exhibits no edema. Neurological: Patient is alert and oriented to person, place, and time. Patient has normal strength. GCS eye subscore is 4. GCS verbal subscore is 5. GCS motor subscore is 6. Skin: Skin is warm and dry. No abrasion and no rash noted. Patient is not diaphoretic. No cyanosis or erythema. Psychiatric: Patient has a normal mood and affect. Speech is normal and behavior is normal. Cognition and memory are normal.       A/P    Gale Albrecht is 40 y.o. female, Body mass index is 44.93 kg/m². pre surgery, has lost 1.4 lbs since last visit. The patient underwent extensive dietary counseling with registered dietician. I have reviewed, discussed and agree with the dietary plan. Patient is trying hard to keep good dietary and behavior modifications. Patient is monitoring portion sizes, food choices and liquid calories. Patient is trying to exercise regularly as much as possible. Obesity as a disease is considered a high risk to patients overall health and should therefore be considered a high risk disease state.    Advised the patient that not getting there weight under control, that could increase risk of complications/worsening of those conditions on the long-term. (Goal of weight loss surgery is to alleviate/control some of those co-morbidities)    Now with Covid-19 pandemic, CDC and health authorities does classify obese patients as vulnerable and high risk as well. Which makes weight loss a priority for improvement of their wellbeing and overall health. I encouraged the patient to continue exercise and keeping healthy eating habits. Discussed pre-op labs and work up till now. Also counseled the patient extensively on Surgery. I did explain thoroughly to the patient that compliance with pre- and post op diet and other recommendations are integral part to improve the chances of successful weight loss and also not following it could end with serious health complications. Some strategies discussed today include but not limited to : 30/30/30 minutes rule, food diary, avoid fast food and packing/planing ahead, & increasing exercise. Also stressed to the patient importance of taking the multivitamins as instructed, otherwise risk significant complications. The visit today, included any number of the following: Bariatric Preoperative work up/protocols, review of labs, imaging, provider notes, outside hospital records, performing examination/evaluation, counseling patient and/or family, ordering medications/tests, placing referrals and communication with referring physicians, coordination of care; discussing dietary plan/recall with the patient as well with registered dietitian and documentation in the EHR. Of note, the above was done during same day of the actual patient encounter. Vernon Jaramillo is here for her second pre surgical.  Overall doing well. Making good progress. Still working on quitting smoking and her preoperative clearances. We will see the patient next month for continued follow-up.       We discussed how her excess weight affects her overall health and importance of weight loss, healthy diet and active lifestyle to alleviate those co morbid conditions, otherwise risk deterioration. Morbid Obesity: Body mass index is 44.93 kg/m². [x] Continue to make dietary and lifestyle modifications. [x] Plan for Future laparoscopic sleeve gastrectomy. [x] Return for follow-up next month. Chronic GERD:   [x] Continue to make dietary and lifestyle modifications. [] Continue Omeprazole. [] Continue Famotidine. [x] Plan for EGD to evaluate the stomach. Patient advised that its their responsibility to follow up for studies, referrals and/or labs ordered today.

## 2022-12-08 NOTE — PROGRESS NOTES
Kita Pena lost 1.4 lbs over past 6 weeks. Works as [employer] 7-7a - eats 3 x day on work days. Breakfast: off day - 2 eggs with multigrain waffle with syrup OR nuts/dried cranberries / salad at work     Snack: nuts/dried cranberries    Lunch: PB+J / bologna /turkey sandwich with individual bag of chips     Snack: nothing     Dinner: spaghetti with minimal meat sauce OR meatloaf with mashed potatoes and green beans OR work day - wakes up at [employer] and eats around 6-8 p.m.- [employer] (cheddar broccoli soup / 1/2 turkey sandwich / flatbread pizza / mac and cheese) OR chip[employer] - 1/2 bowl with steak / farideh/cheese/fajita veggies / salsa    Snack: nothing OR might have little lizett snack cake OR nuts/dried cranberries at work     Is pt consuming smaller portions? Reports she needs to measure     Is pt consuming at least 64 oz of fluids per day? Around this     Is pt consuming carbonated, caffeinated, or sugary beverages? Yes - Reduced soda in half to 3 cans/day, water flavoring with caffeine, eliminated sweet tea occasionally    Has pt sampled Unjury and/or Nectar protein? Not yet, discussed today     Has patient attended a support group?  Not yet, discussed today     Exercise: A lot of active steps at work / lifting     Plan/Recommendations:   Avoid high fat/sugar foods - work on food quality  Eat 4 x day - eat earlier on work days   Avoid soda/caffeine     Handouts: SG schedule, protein shake sample, presurgical diet, protein shake handout     Qiana Smith, RD, LD

## 2022-12-12 NOTE — TELEPHONE ENCOUNTER
Pt is scheduled for EGD on 01/27/2023 at 845 am. Went over NPO after midnight/ clear liquid diet 24 hours prior/ stopping medications 7 days prior/ needs a  for EGD. Pt verbalized understanding and was emailed instructions for EGD.

## 2023-01-17 ENCOUNTER — OFFICE VISIT (OUTPATIENT)
Dept: CARDIOLOGY CLINIC | Age: 43
End: 2023-01-17
Payer: COMMERCIAL

## 2023-01-17 ENCOUNTER — TELEPHONE (OUTPATIENT)
Dept: CARDIOLOGY CLINIC | Age: 43
End: 2023-01-17

## 2023-01-17 VITALS
TEMPERATURE: 98.6 F | BODY MASS INDEX: 44.9 KG/M2 | HEIGHT: 64 IN | WEIGHT: 263 LBS | HEART RATE: 87 BPM | OXYGEN SATURATION: 93 % | DIASTOLIC BLOOD PRESSURE: 75 MMHG | SYSTOLIC BLOOD PRESSURE: 113 MMHG

## 2023-01-17 DIAGNOSIS — Z01.810 PREOP CARDIOVASCULAR EXAM: Primary | ICD-10-CM

## 2023-01-17 DIAGNOSIS — E66.01 MORBID OBESITY WITH BMI OF 45.0-49.9, ADULT (HCC): ICD-10-CM

## 2023-01-17 DIAGNOSIS — I45.10 RIGHT BUNDLE BRANCH BLOCK: ICD-10-CM

## 2023-01-17 PROCEDURE — G8427 DOCREV CUR MEDS BY ELIG CLIN: HCPCS | Performed by: INTERNAL MEDICINE

## 2023-01-17 PROCEDURE — G8417 CALC BMI ABV UP PARAM F/U: HCPCS | Performed by: INTERNAL MEDICINE

## 2023-01-17 PROCEDURE — G8484 FLU IMMUNIZE NO ADMIN: HCPCS | Performed by: INTERNAL MEDICINE

## 2023-01-17 PROCEDURE — 99204 OFFICE O/P NEW MOD 45 MIN: CPT | Performed by: INTERNAL MEDICINE

## 2023-01-17 PROCEDURE — 93000 ELECTROCARDIOGRAM COMPLETE: CPT | Performed by: INTERNAL MEDICINE

## 2023-01-17 NOTE — TELEPHONE ENCOUNTER
Pt having blood work completed 1/17/23 at [facility].   Look at results on 1/18/23

## 2023-01-17 NOTE — LETTER
[address]  [address]  [address] [place] [address]  Phone: [phone]  Fax: [phone]    Jorie Castleman, MD        January 17, 2023      [place]  1980    Patient is low risk clinically for non cardiac type of surgery. May proceed as planned.         Sincerely,        Jorie Castleman, MD

## 2023-01-17 NOTE — PROGRESS NOTES
[address] Office consultation  Note  2023     Referred by Dr. Elizabeth Brown:  Ms. Edinson Fang is cardiac clearance of bariatric surgery  PMH GERD, anxiety, obesity, bile leak, migraines  C/o pre-op cardiac clearance    HPI:   EKG 13 Normal sinus rhythm Left axis deviation Low voltage QRS Incomplete right bundle branch block  Echo 12 EF=55-60%, normal     Today, she reports she is here for cardiac clearance of bariatric surgery in April. She denies any previous heart problems. She had 5 babies and gave one up for adoption. Patient denies current edema, chest pain, sob, palpitations, dizziness or syncope. Patient is taking all cardiac medications as prescribed and tolerates them well. Patient is vaccinated against Covid. Dorcas Rim 3/3    12 point ROS negative in all areas as listed below except as in [place]  Constitutional, EENT, Cardiovascular, pulmonary, GI, , Musculoskeletal, skin, neurological, hematological, endocrine, Psychiatric    Reviewed past medical history, social, and family history.    Smoking: was smoking 2 packs a day, now 1/2 pack a day  Alcohol:none  Recreational Drugs:none  Family History: family history unknown, birth mom  young she is adopted  Works as [employer] in [employer] [place] [employer] at an assisted living facility    Past Medical History:   Diagnosis Date    Anxiety     GERD (gastroesophageal reflux disease)     Leg pain, bilateral     Migraine     Overweight      Past Surgical History:   Procedure Laterality Date     SECTION      x 5    OTHER SURGICAL HISTORY  2014    laparoscopic cholecystectomy    TUBAL LIGATION      WISDOM TOOTH EXTRACTION         Objective:   /75   Pulse 87   Temp 98.6 °F (37 °C)   Ht 5' 4\" (1.626 m)   Wt 263 lb (119.3 kg)   SpO2 93%   BMI 45.14 kg/m²     Wt Readings from Last 3 Encounters:   23 263 lb (119.3 kg)   22 270 lb (122.5 kg)   10/27/22 271 lb 6.4 oz (123.1 kg)       Physical Exam:  General: No Respiratory distress, appears well developed and well nourished. Eyes:  Sclera nonicteric  Nose/Sinuses:  negative findings: nose shows no deformity, asymmetry, or inflammation, nasal mucosa normal, septum midline with no perforation or bleeding  Back:  no pain to palpation  Joint:  no active joint inflammation  Musculoskeletal:  negative  Skin:  Warm and dry  Neck:  Negative for JVD and Carotid Bruits. Chest:  Clear to auscultation, respiration easy  Cardiovascular:  RRR 80 bpm, S1S2 normal, no murmur, no rub or thrill.   Abdomen:  Soft normal liver and spleen non tender no masses  Extremities:   No edema, clubbing, cyanosis,  Pulses: pedal pulses are normal.  Neuro: intact    Medications:   Outpatient Encounter Medications as of 1/17/2023   Medication Sig Dispense Refill    Multiple Vitamins-Minerals (THERAPEUTIC MULTIVITAMIN-MINERALS) tablet Take 1 tablet by mouth daily      Cholecalciferol (VITAMIN D3) 50 MCG (2000 UT) CAPS Take by mouth      Buprenorphine HCl (SUBUTEX SL) Place under the tongue      diphenhydrAMINE HCl, TOPICAL, (BENADRYL ITCH STOPPING) 2 % GEL Apply 10 mLs topically in the morning, at noon, and at bedtime 118 mL 0    EPINEPHrine (EPIPEN 2-LARRY) 0.3 MG/0.3ML SOAJ injection Inject 0.3 mLs into the muscle once for 1 dose Use as directed for allergic reaction 0.3 mL 0    nicotine (NICODERM CQ) 21 MG/24HR       buPROPion (WELLBUTRIN XL) 150 MG extended release tablet       cyclobenzaprine (FLEXERIL) 5 MG tablet       famotidine (PEPCID) 20 MG tablet Take 1 tablet by mouth 2 times daily for 7 days 14 tablet 0    [DISCONTINUED] VENTOLIN  (90 Base) MCG/ACT inhaler       [DISCONTINUED] ADVAIR DISKUS 250-50 MCG/ACT AEPB diskus inhaler       [DISCONTINUED] ondansetron (ZOFRAN-ODT) 4 MG disintegrating tablet Take 1 tablet by mouth 3 times daily as needed for Nausea or Vomiting 21 tablet 0    [DISCONTINUED] sulfamethoxazole-trimethoprim (BACTRIM DS) 800-160 MG per tablet Take 1 tablet by mouth 2 times daily [DISCONTINUED] HYDROcodone-acetaminophen (NORCO) 5-325 MG per tablet Take 1 tablet by mouth every 6 hours as needed for Pain 6 tablet 0     No facility-administered encounter medications on file as of 1/17/2023. Lab Data:  CBC: No results for input(s): WBC, HGB, HCT, MCV, PLT in the last 72 hours. BMP: No results for input(s): NA, K, CL, CO2, PHOS, BUN, CREATININE, CA in the last 72 hours. LIVER PROFILE: No results for input(s): AST, ALT, LIPASE, BILIDIR, BILITOT, ALKPHOS in the last 72 hours. Invalid input(s): AMYLASE,  ALB  LIPID: No results found for: CHOL  No results found for: TRIG  No results found for: HDL  No results found for: LDLCHOLESTEROL, LDLCALC  No results found for: LABVLDL, VLDL  No results found for: CHOLHDLRATIO  PT/INR: No results for input(s): PROTIME, INR in the last 72 hours. A1C: No results found for: LABA1C  BNP:  No results for input(s): BNP in the last 72 hours. IMAGING:   EKG 1.17.23  NSR complete RBBB Left anterior hemiblock normal [place] interval  EKG 8/4/12  Normal sinus rhythm Left axis deviation Low voltage QRS Incomplete right bundle branch block     Echo 1/26/12  1. The aortic valve is normal and non-restricted. 2.  LV ejection fraction is estimated to be 55-60%. 3.  Normal LV diastolic function. 4.  The left ventricle is normal in size, normal wall thickness, and   overall normal global and regional systolic function. Assessment:  Michele Moran was seen today for new patient and other. Diagnoses and all orders for this visit:    Preop cardiovascular exam  -     EKG 12 lead    Morbid obesity with BMI of 45.0-49.9, adult ([address]    Right bundle branch block        Plan:  Current epic labs reviewed with patient  Current medications reviewed. Complete blood work for Dr. Brennan Martin today after your visit with me. EKG today     Follow up with me as needed in the future. This note is scribed in the presence of Dr. David Zimmer MD by Clauida Vick RN.   She is low cardiac risk for bariatric surgery. QUALITY MEASURES  1. Tobacco Cessation Counseling: Yes  2. Retake of BP if >140/90:   NA  3. Documentation to PCP/referring for new patient:  Sent to PCP at close of office visit  4. CAD patient on anti-platelet: NA  5. CAD patient on STATIN therapy:  NA  6. Patient with CHF and aFib on anticoagulation:  NA   I, Dr. Kurtis Shelby, personally performed the services described in this documentation, as scribed by the above signed scribe in my presence. It is both accurate and complete to my knowledge. I agree with the details independently gathered by the clinical support staff, while the remaining scribed note accurately describes my personal service to the patient.     Marly Stubbs MD, MD 1/17/2023 4:01 PM

## 2023-01-17 NOTE — PATIENT INSTRUCTIONS
Plan:  Current epic labs reviewed with patient  Current medications reviewed. Complete blood work for Dr. Eleazar Stapleton today after your visit with me. EKG today     Follow up with me as needed in the future.

## 2023-01-19 ENCOUNTER — HOSPITAL ENCOUNTER (OUTPATIENT)
Age: 43
Discharge: HOME | End: 2023-01-19
Payer: COMMERCIAL

## 2023-01-19 ENCOUNTER — OFFICE VISIT (OUTPATIENT)
Dept: BARIATRICS/WEIGHT MGMT | Age: 43
End: 2023-01-19
Payer: COMMERCIAL

## 2023-01-19 VITALS
DIASTOLIC BLOOD PRESSURE: 72 MMHG | HEIGHT: 65 IN | WEIGHT: 274 LBS | SYSTOLIC BLOOD PRESSURE: 138 MMHG | RESPIRATION RATE: 18 BRPM | HEART RATE: 83 BPM | BODY MASS INDEX: 45.65 KG/M2 | OXYGEN SATURATION: 100 %

## 2023-01-19 DIAGNOSIS — K21.9 CHRONIC GERD: ICD-10-CM

## 2023-01-19 DIAGNOSIS — Z01.818 PREOPERATIVE CLEARANCE: ICD-10-CM

## 2023-01-19 DIAGNOSIS — E66.01 MORBID OBESITY WITH BMI OF 45.0-49.9, ADULT (HCC): ICD-10-CM

## 2023-01-19 DIAGNOSIS — E66.01 MORBID OBESITY WITH BMI OF 45.0-49.9, ADULT (HCC): Primary | ICD-10-CM

## 2023-01-19 LAB
BASOPHILS ABSOLUTE: 0.1 K/UL (ref 0–0.2)
BASOPHILS RELATIVE PERCENT: 0.9 %
EOSINOPHILS ABSOLUTE: 0.3 K/UL (ref 0–0.6)
EOSINOPHILS RELATIVE PERCENT: 2.7 %
HCT VFR BLD CALC: 38.5 % (ref 36–48)
HEMOGLOBIN: 12.6 G/DL (ref 12–16)
INR BLD: 0.99 (ref 0.87–1.14)
LYMPHOCYTES ABSOLUTE: 3.1 K/UL (ref 1–5.1)
LYMPHOCYTES RELATIVE PERCENT: 29.5 %
MCH RBC QN AUTO: 26.5 PG (ref 26–34)
MCHC RBC AUTO-ENTMCNC: 32.8 G/DL (ref 31–36)
MCV RBC AUTO: 80.7 FL (ref 80–100)
MONOCYTES ABSOLUTE: 0.6 K/UL (ref 0–1.3)
MONOCYTES RELATIVE PERCENT: 5.5 %
NEUTROPHILS ABSOLUTE: 6.5 K/UL (ref 1.7–7.7)
NEUTROPHILS RELATIVE PERCENT: 61.4 %
PDW BLD-RTO: 15.2 % (ref 12.4–15.4)
PLATELET # BLD: 229 K/UL (ref 135–450)
PMV BLD AUTO: 8.8 FL (ref 5–10.5)
PROTHROMBIN TIME: 12.9 SEC (ref 11.7–14.5)
RBC # BLD: 4.77 M/UL (ref 4–5.2)
WBC # BLD: 10.6 K/UL (ref 4–11)

## 2023-01-19 PROCEDURE — G8427 DOCREV CUR MEDS BY ELIG CLIN: HCPCS | Performed by: SURGERY

## 2023-01-19 PROCEDURE — 4004F PT TOBACCO SCREEN RCVD TLK: CPT | Performed by: SURGERY

## 2023-01-19 PROCEDURE — 99214 OFFICE O/P EST MOD 30 MIN: CPT | Performed by: SURGERY

## 2023-01-19 PROCEDURE — 82306 VITAMIN D 25 HYDROXY: CPT

## 2023-01-19 PROCEDURE — 84443 ASSAY THYROID STIM HORMONE: CPT

## 2023-01-19 PROCEDURE — 82746 ASSAY OF FOLIC ACID SERUM: CPT

## 2023-01-19 PROCEDURE — G8484 FLU IMMUNIZE NO ADMIN: HCPCS | Performed by: SURGERY

## 2023-01-19 PROCEDURE — 83550 IRON BINDING TEST: CPT

## 2023-01-19 PROCEDURE — 84425 ASSAY OF VITAMIN B-1: CPT

## 2023-01-19 PROCEDURE — 85025 COMPLETE CBC W/AUTO DIFF WBC: CPT

## 2023-01-19 PROCEDURE — 85610 PROTHROMBIN TIME: CPT

## 2023-01-19 PROCEDURE — 84590 ASSAY OF VITAMIN A: CPT

## 2023-01-19 PROCEDURE — 83540 ASSAY OF IRON: CPT

## 2023-01-19 PROCEDURE — 80053 COMPREHEN METABOLIC PANEL: CPT

## 2023-01-19 PROCEDURE — 80061 LIPID PANEL: CPT

## 2023-01-19 PROCEDURE — G8417 CALC BMI ABV UP PARAM F/U: HCPCS | Performed by: SURGERY

## 2023-01-19 PROCEDURE — 83036 HEMOGLOBIN GLYCOSYLATED A1C: CPT

## 2023-01-19 PROCEDURE — 36415 COLL VENOUS BLD VENIPUNCTURE: CPT

## 2023-01-19 PROCEDURE — 82607 VITAMIN B-12: CPT

## 2023-01-19 PROCEDURE — 84446 ASSAY OF VITAMIN E: CPT

## 2023-01-19 NOTE — PROGRESS NOTES
[facility]) Physicians   [employer] [facility] [employer]  Scar Wood MD, [facility] [address], [address] [facility], [address] [facility]    [place]  [address] . Phone: [phone]  Fax: [phone]          Chief Complaint   Patient presents with    Obesity     3rd pre-surg           HPI:     Florence Paul is a very pleasant 40 y.o. female with Body mass index is 45.6 kg/m². / Chronic Obesity. Louie Fernandez has been struggling for several years now with obesity. Louie Fernandez feels the weight is an obstacle to achieve and perform things in daily living as well risk on health. Patient  is very determined to lose weight and be healthy, and is working towards  surgical weight loss to achieve this goal. Pre-operative clearance and work up pending. Working hard to keep good dietary habits as well level of activity. Patient denies any nausea, vomiting, fevers, chills, shortness of breath, chest pain, cough, constipation or difficulty urinating. Pain Assessment   Denies any abdominal pain       Past Medical History:   Diagnosis Date    Anxiety     GERD (gastroesophageal reflux disease)     Leg pain, bilateral     Migraine     Overweight      Past Surgical History:   Procedure Laterality Date     SECTION      x 5    OTHER SURGICAL HISTORY  2014    laparoscopic cholecystectomy    TUBAL LIGATION      WISDOM TOOTH EXTRACTION       No family history on file. Social History     Tobacco Use    Smoking status: Every Day     Packs/day: 1.50     Years: 19.00     Pack years: 28.50     Types: Cigarettes    Smokeless tobacco: Never    Tobacco comments:     Down to few cigs per day starting 2022   Substance Use Topics    Alcohol use: Not Currently     Comment: occasional     I counseled the patient on the importance of not smoking and risks of ETOH.    Allergies   Allergen Reactions    Toradol [Ketorolac Tromethamine] Other (See Comments)     \"restless legs\"     Vitals:    23 1153   BP: 138/72   Pulse: 83   Resp: 18   SpO2: 100%   Weight: 274 lb (124.3 kg)   Height: 5' 5\" (1.651 m)       Body mass index is 45.6 kg/m².     Lab Results   Component Value Date/Time    WBC 9.9 04/04/2017 10:58 AM    RBC 5.11 04/04/2017 10:58 AM    HGB 14.0 04/04/2017 10:58 AM    HCT 42.4 04/04/2017 10:58 AM    MCV 83.0 04/04/2017 10:58 AM    MCH 27.4 04/04/2017 10:58 AM    MCHC 33.0 04/04/2017 10:58 AM    MPV 8.8 04/04/2017 10:58 AM    NEUTOPHILPCT 64.4 04/04/2017 10:58 AM    LYMPHOPCT 24.9 04/04/2017 10:58 AM    MONOPCT 7.0 04/04/2017 10:58 AM    EOSRELPCT 2.3 04/04/2017 10:58 AM    BASOPCT 1.4 04/04/2017 10:58 AM    NEUTROABS 6.4 04/04/2017 10:58 AM    LYMPHSABS 2.5 04/04/2017 10:58 AM    MONOSABS 0.7 04/04/2017 10:58 AM    EOSABS 0.2 04/04/2017 10:58 AM     Lab Results   Component Value Date/Time     04/04/2017 10:58 AM    K 3.8 04/04/2017 10:58 AM     04/04/2017 10:58 AM    CO2 23 04/04/2017 10:58 AM    ANIONGAP 15 04/04/2017 10:58 AM    GLUCOSE 109 04/04/2017 10:58 AM    BUN 6 04/04/2017 10:58 AM    CREATININE 0.6 04/04/2017 10:58 AM    LABGLOM >60 04/04/2017 10:58 AM    GFRAA >60 04/04/2017 10:58 AM    GFRAA >60 06/29/2012 06:18 PM    CALCIUM 9.1 04/04/2017 10:58 AM    PROT 7.4 04/04/2017 10:58 AM    PROT 7.8 06/29/2012 06:18 PM    LABALBU 4.3 04/04/2017 10:58 AM    AGRATIO 1.4 04/04/2017 10:58 AM    BILITOT <0.2 04/04/2017 10:58 AM    ALKPHOS 62 04/04/2017 10:58 AM    ALT 10 04/04/2017 10:58 AM    AST 16 04/04/2017 10:58 AM    GLOB 3.1 04/04/2017 10:58 AM     No results found for: CHOL, TRIG, HDL, LDLCALC, LABVLDL  No results found for: TSHREFLEX  No results found for: IRON, TIBC, LABIRON  No results found for: YEMIKMTR61, FOLATE  No results found for: VITD25  No results found for: LABA1C, EAG      Current Outpatient Medications:     Multiple Vitamins-Minerals (THERAPEUTIC MULTIVITAMIN-MINERALS) tablet, Take 1 tablet by mouth daily, Disp: , Rfl:     Cholecalciferol (VITAMIN D3) 50 MCG (2000 UT) CAPS, Take by mouth, Disp: , Rfl: Buprenorphine HCl (SUBUTEX SL), Place under the tongue, Disp: , Rfl:     diphenhydrAMINE HCl, TOPICAL, (BENADRYL ITCH STOPPING) 2 % GEL, Apply 10 mLs topically in the morning, at noon, and at bedtime, Disp: 118 mL, Rfl: 0    nicotine (NICODERM CQ) 21 MG/24HR, , Disp: , Rfl:     buPROPion (WELLBUTRIN XL) 150 MG extended release tablet, , Disp: , Rfl:     cyclobenzaprine (FLEXERIL) 5 MG tablet, , Disp: , Rfl:     EPINEPHrine (EPIPEN 2-LARRY) 0.3 MG/0.3ML SOAJ injection, Inject 0.3 mLs into the muscle once for 1 dose Use as directed for allergic reaction, Disp: 0.3 mL, Rfl: 0    famotidine (PEPCID) 20 MG tablet, Take 1 tablet by mouth 2 times daily for 7 days, Disp: 14 tablet, Rfl: 0    Review of Systems - History obtained from the patient  General ROS: negative  Psychological ROS: negative  Ophthalmic ROS: negative  Neurological ROS: negative  ENT ROS: negative  Allergy and Immunology ROS: negative  Hematological and Lymphatic ROS: negative  Endocrine ROS: negative  Breast ROS: negative  Respiratory ROS: negative  Cardiovascular ROS: negative  Gastrointestinal ROS:negative  Genito-Urinary ROS: negative  Musculoskeletal ROS: negative   Skin ROS: negative    Physical Exam   Vitals Reviewed   Constitutional: Patient is oriented to person, place, and time. Patient appears well-developed and well-nourished. Patient is active and cooperative. Non-toxic appearance. No distress. HENT:   Head: Normocephalic and atraumatic. Head is without abrasion and without laceration. Hair is normal.   Right Ear: External ear normal. No lacerations. No drainage, swelling . Left Ear: External ear normal. No lacerations. No drainage, swelling. Nose/Mouth: face mask in place  Eyes: Conjunctivae, EOM and lids are normal. Right eye exhibits no discharge. No foreign body present in the right eye. Left eye exhibits no discharge. No foreign body present in the left eye. No scleral icterus. Neck: Trachea normal and normal range of motion.  No JVD present. Pulmonary/Chest: Effort normal. No accessory muscle usage or stridor. No apnea. No respiratory distress. Cardiovascular: Normal rate and no JVD. Abdominal: Normal appearance. Patient exhibits no distension. Abdomen is soft, obese, non tender. Musculoskeletal: Normal range of motion. Patient exhibits no edema. Neurological: Patient is alert and oriented to person, place, and time. Patient has normal strength. GCS eye subscore is 4. GCS verbal subscore is 5. GCS motor subscore is 6. Skin: Skin is warm and dry. No abrasion and no rash noted. Patient is not diaphoretic. No cyanosis or erythema. Psychiatric: Patient has a normal mood and affect. Speech is normal and behavior is normal. Cognition and memory are normal.       A/P    Ines Gone is 40 y.o. female, Body mass index is 45.6 kg/m². pre surgery, has gained few lbs since last visit. The patient underwent extensive dietary counseling with registered dietician. I have reviewed, discussed and agree with the dietary plan. Patient is trying hard to keep good dietary and behavior modifications. Patient is monitoring portion sizes, food choices and liquid calories. Patient is trying to exercise regularly as much as possible. Obesity as a disease is considered a high risk to patients overall health and should therefore be considered a high risk disease state. Advised the patient that not getting there weight under control, that could increase risk of complications/worsening of those conditions on the long-term. (Goal of weight loss surgery is to alleviate/control some of those co-morbidities)    Now with Covid-19 pandemic, CDC and health authorities does classify obese patients as vulnerable and high risk as well. Which makes weight loss a priority for improvement of their wellbeing and overall health. I encouraged the patient to continue exercise and keeping healthy eating habits. Discussed pre-op labs and work up till now. Also counseled the patient extensively on Surgery. I did explain thoroughly to the patient that compliance with pre- and post op diet and other recommendations are integral part to improve the chances of successful weight loss and also not following it could end with serious health complications. Some strategies discussed today include but not limited to : 30/30/30 minutes rule, food diary, avoid fast food and packing/planing ahead, & increasing exercise. Also stressed to the patient importance of taking the multivitamins as instructed, otherwise risk significant complications. The visit today, included any number of the following: Bariatric Preoperative work up/protocols, review of labs, imaging, provider notes, outside hospital records, performing examination/evaluation, counseling patient and/or family, ordering medications/tests, placing referrals and communication with referring physicians, coordination of care; discussing dietary plan/recall with the patient as well with registered dietitian and documentation in the EHR. Of note, the above was done during same day of the actual patient encounter. Renaldo Jessica is here for her third presurgical visit. Patient still working at the preoperative clearances. We will see the patient next month for continued follow-up    We discussed how her excess weight affects her overall health and importance of weight loss, healthy diet and active lifestyle to alleviate those co morbid conditions, otherwise risk deterioration. Morbid Obesity: Body mass index is 45.6 kg/m². [x] Continue to make dietary and lifestyle modifications. [x] Plan for Future laparoscopic sleeve gastrectomy. [x] Return for follow-up next month. Chronic GERD:   [x] Continue to make dietary and lifestyle modifications. [] Continue Omeprazole. [] Continue Famotidine. [x] Plan for EGD to evaluate the stomach.                  Patient advised that its their responsibility to follow up for studies, referrals and/or labs ordered today.

## 2023-01-19 NOTE — PROGRESS NOTES
Yoshi Lind gained 4 lbs over 1 month. Works as [employer] 7p-7a - eats 3 x day on work days. Has decreased smoking from 2 packs down to 6 per day    Pt did not work yesterday. Woke up at noon  Breakfast: 2 hb egg, orange slices and small bowl unflavored oatmeal 1 spoonful brown sugar    Snack: no  dried pineapple/walnuts pack (2 g protein)    Lunch: grill chix salad with ff ranch with cheese     Snack: NV protein bar    Dinner: same as lunch    Snack:  no    Is pt eating at least 4 times everyday? yes she is     Is pt eating a lean protein source with all meals and snacks? No     Has pt decreased their portions using the plate method? Smaller plates and bowls    Is pt choosing low fat/sugar free options? Yes, except last can of soda    Is pt drinking at least 64 oz of clear liquids everyday? Just water     Has pt stopped drinking carbonation, caffeinated, and sugar sweetened beverages? Had cut soda in half from 6 cans per day down to pepsi in the morning, eliminated sweet tea     Has pt sampled Unjury and/or Nectar protein? Did not try at last visit. Will purchase 4 today. Has Patient Attended a Support Group? Unable to schedule - sign up today     Participating in intentional exercise? A lot of active steps at work / lifting (5-7 miles on shift 3 night per week) also walking 2 miles at home 2 days per week    Plan/Recommendations:   More protein at snacks  Get some protein powders today  Signed up for [facility] today - for 1/23 via Zoom   Find calorie free sweetener/flavors   Wean off last can of soda.    Handouts: none     Crissy Scott, MS, RD, LD

## 2023-01-20 LAB
A/G RATIO: 1.4 (ref 1.1–2.2)
ALBUMIN SERPL-MCNC: 4 G/DL (ref 3.4–5)
ALP BLD-CCNC: 81 U/L (ref 40–129)
ALT SERPL-CCNC: 11 U/L (ref 10–40)
ANION GAP SERPL CALCULATED.3IONS-SCNC: 11 MMOL/L (ref 3–16)
AST SERPL-CCNC: 19 U/L (ref 15–37)
BILIRUB SERPL-MCNC: <0.2 MG/DL (ref 0–1)
BUN BLDV-MCNC: 4 MG/DL (ref 7–20)
CALCIUM SERPL-MCNC: 9.1 MG/DL (ref 8.3–10.6)
CHLORIDE BLD-SCNC: 100 MMOL/L (ref 99–110)
CHOLESTEROL, TOTAL: 155 MG/DL (ref 0–199)
CO2: 25 MMOL/L (ref 21–32)
CREAT SERPL-MCNC: 0.6 MG/DL (ref 0.6–1.1)
ESTIMATED AVERAGE GLUCOSE: 114 MG/DL
FOLATE: 2.53 NG/ML (ref 4.78–24.2)
GFR SERPL CREATININE-BSD FRML MDRD: >60 ML/MIN/{1.73_M2}
GLUCOSE BLD-MCNC: 79 MG/DL (ref 70–99)
HBA1C MFR BLD: 5.6 %
HDLC SERPL-MCNC: 34 MG/DL (ref 40–60)
IRON SATURATION: 8 % (ref 15–50)
IRON: 30 UG/DL (ref 37–145)
LDL CHOLESTEROL CALCULATED: 106 MG/DL
POTASSIUM SERPL-SCNC: 4 MMOL/L (ref 3.5–5.1)
SODIUM BLD-SCNC: 136 MMOL/L (ref 136–145)
TOTAL IRON BINDING CAPACITY: 359 UG/DL (ref 260–445)
TOTAL PROTEIN: 6.9 G/DL (ref 6.4–8.2)
TRIGL SERPL-MCNC: 75 MG/DL (ref 0–150)
TSH REFLEX: 1.24 UIU/ML (ref 0.27–4.2)
VITAMIN B-12: 381 PG/ML (ref 211–911)
VITAMIN D 25-HYDROXY: 7.4 NG/ML
VLDLC SERPL CALC-MCNC: 15 MG/DL

## 2023-01-24 NOTE — TELEPHONE ENCOUNTER
Rajesh Tran with pt and she is following with her pcp for these deficiencies. She stated she has taken as much as 53102 units of Vit D and her body does not absorb it. She will call pcp and relay new lab results.

## 2023-02-09 ENCOUNTER — OFFICE VISIT (OUTPATIENT)
Dept: BARIATRICS/WEIGHT MGMT | Age: 43
End: 2023-02-09
Payer: COMMERCIAL

## 2023-02-09 VITALS
BODY MASS INDEX: 44.32 KG/M2 | DIASTOLIC BLOOD PRESSURE: 80 MMHG | OXYGEN SATURATION: 95 % | RESPIRATION RATE: 18 BRPM | WEIGHT: 266 LBS | SYSTOLIC BLOOD PRESSURE: 125 MMHG | HEART RATE: 87 BPM | HEIGHT: 65 IN

## 2023-02-09 DIAGNOSIS — E66.01 MORBID OBESITY WITH BMI OF 45.0-49.9, ADULT (HCC): Primary | ICD-10-CM

## 2023-02-09 DIAGNOSIS — E78.5 DYSLIPIDEMIA (HIGH LDL; LOW HDL): ICD-10-CM

## 2023-02-09 DIAGNOSIS — K21.9 CHRONIC GERD: ICD-10-CM

## 2023-02-09 PROCEDURE — G8484 FLU IMMUNIZE NO ADMIN: HCPCS | Performed by: SURGERY

## 2023-02-09 PROCEDURE — 4004F PT TOBACCO SCREEN RCVD TLK: CPT | Performed by: SURGERY

## 2023-02-09 PROCEDURE — G8427 DOCREV CUR MEDS BY ELIG CLIN: HCPCS | Performed by: SURGERY

## 2023-02-09 PROCEDURE — 99214 OFFICE O/P EST MOD 30 MIN: CPT | Performed by: SURGERY

## 2023-02-09 PROCEDURE — G8417 CALC BMI ABV UP PARAM F/U: HCPCS | Performed by: SURGERY

## 2023-02-09 RX ORDER — FERROUS SULFATE 325(65) MG
325 TABLET ORAL 2 TIMES DAILY WITH MEALS
Qty: 60 TABLET | Refills: 3 | Status: SHIPPED | OUTPATIENT
Start: 2023-02-09

## 2023-02-09 RX ORDER — LANOLIN ALCOHOL/MO/W.PET/CERES
400 CREAM (GRAM) TOPICAL DAILY
Qty: 30 TABLET | Refills: 3 | Status: SHIPPED | OUTPATIENT
Start: 2023-02-09

## 2023-02-09 NOTE — PROGRESS NOTES
Abril Pena lost 8 lbs over past ~month. Is pt eating at least 4 times everyday? yes    B- eggs & oatmeal  S- cheese/nuts/cranberry pkt  L- salad w/ grilled chicken  S- similar to AM  D- grilled chicken w/ asparagus & green beans  S- none    Is pt eating a lean protein source with all meals and snacks? yes    Has pt decreased their portions using the plate method? Yes & weighing some foods    Is pt choosing low fat/sugar free options? yes    Is pt drinking at least 64 oz of clear liquids everyday? yes - water / CL    Has pt stopped drinking carbonation, caffeinated, and sugar sweetened beverages?  some CL w/ caffeine / eliminated soda    Has pt sampled Unjury and/or Nectar protein? yes - tried & tolerated    Has pt attended a support group?  Scheduled  2/9/23    Participating in intentional exercise?  no formal exercise, walks a lot at work, [employer]    Plan/Recommendations:   - Continue plan  - Complete Support Group    Handouts: none    Lesa Shelton RD, LD

## 2023-02-09 NOTE — PROGRESS NOTES
[facility] Physicians   [employer] [facility] [employer]  Jovani Mendez MD, [address], [address], [address] [facility]    [place] [address] . Phone: [phone]  Fax: [phone]          Chief Complaint   Patient presents with    Obesity     4th pre-surg         HPI:     Eugenio Youngblood is a very pleasant 40 y.o. female with Body mass index is 44.26 kg/m². / Chronic Obesity. Carlos Alberto Smyth has been struggling for several years now with obesity. Carlos Alberto Smyth feels the weight is an obstacle to achieve and perform things in daily living as well risk on health. Patient  is very determined to lose weight and be healthy, and is working towards  surgical weight loss to achieve this goal. Pre-operative clearance and work up pending. Working hard to keep good dietary habits as well level of activity. Patient denies any nausea, vomiting, fevers, chills, shortness of breath, chest pain, cough, constipation or difficulty urinating. Pain Assessment   Denies any abdominal pain       Past Medical History:   Diagnosis Date    Anxiety     Bile leak 2014    GERD (gastroesophageal reflux disease)     Hypoxemia 2014    Leg pain, bilateral     Migraine     Overweight     Pleural effusion 2014     Past Surgical History:   Procedure Laterality Date     SECTION      x 5    OTHER SURGICAL HISTORY  2014    laparoscopic cholecystectomy    TUBAL LIGATION      WISDOM TOOTH EXTRACTION       No family history on file. Social History     Tobacco Use    Smoking status: Every Day     Packs/day: 1.50     Years: 19.00     Pack years: 28.50     Types: Cigarettes    Smokeless tobacco: Never    Tobacco comments:     Down to few cigs per day starting 2022   Substance Use Topics    Alcohol use: Not Currently     Comment: occasional     I counseled the patient on the importance of not smoking and risks of ETOH.    Allergies   Allergen Reactions    Toradol [Ketorolac Tromethamine] Other (See Comments)     \"restless legs\" Vitals:    02/09/23 1108   BP: 125/80   Pulse: 87   Resp: 18   SpO2: 95%   Weight: 266 lb (120.7 kg)   Height: 5' 5\" (1.651 m)       Body mass index is 44.26 kg/m².     Lab Results   Component Value Date/Time    WBC 10.6 01/19/2023 02:24 PM    RBC 4.77 01/19/2023 02:24 PM    HGB 12.6 01/19/2023 02:24 PM    HCT 38.5 01/19/2023 02:24 PM    MCV 80.7 01/19/2023 02:24 PM    MCH 26.5 01/19/2023 02:24 PM    MCHC 32.8 01/19/2023 02:24 PM    MPV 8.8 01/19/2023 02:24 PM    NEUTOPHILPCT 61.4 01/19/2023 02:24 PM    LYMPHOPCT 29.5 01/19/2023 02:24 PM    MONOPCT 5.5 01/19/2023 02:24 PM    EOSRELPCT 2.7 01/19/2023 02:24 PM    BASOPCT 0.9 01/19/2023 02:24 PM    NEUTROABS 6.5 01/19/2023 02:24 PM    LYMPHSABS 3.1 01/19/2023 02:24 PM    MONOSABS 0.6 01/19/2023 02:24 PM    EOSABS 0.3 01/19/2023 02:24 PM     Lab Results   Component Value Date/Time     01/19/2023 02:24 PM    K 4.0 01/19/2023 02:24 PM     01/19/2023 02:24 PM    CO2 25 01/19/2023 02:24 PM    ANIONGAP 11 01/19/2023 02:24 PM    GLUCOSE 79 01/19/2023 02:24 PM    BUN 4 01/19/2023 02:24 PM    CREATININE 0.6 01/19/2023 02:24 PM    LABGLOM >60 01/19/2023 02:24 PM    GFRAA >60 04/04/2017 10:58 AM    GFRAA >60 06/29/2012 06:18 PM    CALCIUM 9.1 01/19/2023 02:24 PM    PROT 6.9 01/19/2023 02:24 PM    PROT 7.8 06/29/2012 06:18 PM    LABALBU 4.0 01/19/2023 02:24 PM    AGRATIO 1.4 01/19/2023 02:24 PM    BILITOT <0.2 01/19/2023 02:24 PM    ALKPHOS 81 01/19/2023 02:24 PM    ALT 11 01/19/2023 02:24 PM    AST 19 01/19/2023 02:24 PM    GLOB 3.1 04/04/2017 10:58 AM     Lab Results   Component Value Date/Time    CHOL 155 01/19/2023 02:24 PM    TRIG 75 01/19/2023 02:24 PM    HDL 34 01/19/2023 02:24 PM    LDLCALC 106 01/19/2023 02:24 PM    LABVLDL 15 01/19/2023 02:24 PM     Lab Results   Component Value Date/Time    TSHREFLEX 1.24 01/19/2023 02:24 PM     Lab Results   Component Value Date/Time    IRON 30 01/19/2023 02:24 PM    TIBC 359 01/19/2023 02:24 PM    LABIRON 8 01/19/2023 02:24 PM     Lab Results   Component Value Date/Time    VLKJOPDE00 381 01/19/2023 02:24 PM    FOLATE 2.53 01/19/2023 02:24 PM     Lab Results   Component Value Date/Time    VITD25 7.4 01/19/2023 02:24 PM     Lab Results   Component Value Date/Time    LABA1C 5.6 01/19/2023 02:24 PM    .0 01/19/2023 02:24 PM         Current Outpatient Medications:     folic acid (V-R FOLIC ACID) 854 MCG tablet, Take 1 tablet by mouth daily, Disp: 30 tablet, Rfl: 3    ferrous sulfate (IRON 325) 325 (65 Fe) MG tablet, Take 1 tablet by mouth 2 times daily (with meals), Disp: 60 tablet, Rfl: 3    Cholecalciferol 50 MCG (2000 UT) TABS, Take 1 tablet by mouth daily Take 1 tablet by mouth daily.  Start this medication after you finish the weekly regimen, Disp: 90 tablet, Rfl: 2    vitamin D (CHOLECALCIFEROL) 89269 UNIT CAPS, Take 1 capsule by mouth once a week, Disp: 12 capsule, Rfl: 0    Multiple Vitamins-Minerals (THERAPEUTIC MULTIVITAMIN-MINERALS) tablet, Take 1 tablet by mouth daily, Disp: , Rfl:     Cholecalciferol (VITAMIN D3) 50 MCG (2000 UT) CAPS, Take by mouth, Disp: , Rfl:     Buprenorphine HCl (SUBUTEX SL), Place under the tongue, Disp: , Rfl:     diphenhydrAMINE HCl, TOPICAL, (BENADRYL ITCH STOPPING) 2 % GEL, Apply 10 mLs topically in the morning, at noon, and at bedtime, Disp: 118 mL, Rfl: 0    nicotine (NICODERM CQ) 21 MG/24HR, , Disp: , Rfl:     buPROPion (WELLBUTRIN XL) 150 MG extended release tablet, , Disp: , Rfl:     EPINEPHrine (EPIPEN 2-LARRY) 0.3 MG/0.3ML SOAJ injection, Inject 0.3 mLs into the muscle once for 1 dose Use as directed for allergic reaction, Disp: 0.3 mL, Rfl: 0    famotidine (PEPCID) 20 MG tablet, Take 1 tablet by mouth 2 times daily for 7 days, Disp: 14 tablet, Rfl: 0    Review of Systems - History obtained from the patient  General ROS: negative  Psychological ROS: negative  Ophthalmic ROS: negative  Neurological ROS: negative  ENT ROS: negative  Allergy and Immunology ROS: negative  Hematological and Lymphatic ROS: negative  Endocrine ROS: negative  Breast ROS: negative  Respiratory ROS: negative  Cardiovascular ROS: negative  Gastrointestinal ROS:negative  Genito-Urinary ROS: negative  Musculoskeletal ROS: negative   Skin ROS: negative    Physical Exam   Vitals Reviewed   Constitutional: Patient is oriented to person, place, and time. Patient appears well-developed and well-nourished. Patient is active and cooperative. Non-toxic appearance. No distress. HENT:   Head: Normocephalic and atraumatic. Head is without abrasion and without laceration. Hair is normal.   Right Ear: External ear normal. No lacerations. No drainage, swelling . Left Ear: External ear normal. No lacerations. No drainage, swelling. Nose/Mouth: face mask in place  Eyes: Conjunctivae, EOM and lids are normal. Right eye exhibits no discharge. No foreign body present in the right eye. Left eye exhibits no discharge. No foreign body present in the left eye. No scleral icterus. Neck: Trachea normal and normal range of motion. No JVD present. Pulmonary/Chest: Effort normal. No accessory muscle usage or stridor. No apnea. No respiratory distress. Cardiovascular: Normal rate and no JVD. Abdominal: Normal appearance. Patient exhibits no distension. Abdomen is soft, obese, non tender. Musculoskeletal: Normal range of motion. Patient exhibits no edema. Neurological: Patient is alert and oriented to person, place, and time. Patient has normal strength. GCS eye subscore is 4. GCS verbal subscore is 5. GCS motor subscore is 6. Skin: Skin is warm and dry. No abrasion and no rash noted. Patient is not diaphoretic. No cyanosis or erythema. Psychiatric: Patient has a normal mood and affect. Speech is normal and behavior is normal. Cognition and memory are normal.       A/P    Rhetta Eth is 40 y.o. female, Body mass index is 44.26 kg/m². pre surgery, has lost 8 lbs since last visit.  The patient underwent extensive dietary counseling with registered dietician. I have reviewed, discussed and agree with the dietary plan. Patient is trying hard to keep good dietary and behavior modifications. Patient is monitoring portion sizes, food choices and liquid calories. Patient is trying to exercise regularly as much as possible. Obesity as a disease is considered a high risk to patients overall health and should therefore be considered a high risk disease state. Advised the patient that not getting there weight under control, that could increase risk of complications/worsening of those conditions on the long-term. (Goal of weight loss surgery is to alleviate/control some of those co-morbidities)    Now with Covid-19 pandemic, CDC and health authorities does classify obese patients as vulnerable and high risk as well. Which makes weight loss a priority for improvement of their wellbeing and overall health. I encouraged the patient to continue exercise and keeping healthy eating habits. Discussed pre-op labs and work up till now. Also counseled the patient extensively on Surgery. I did explain thoroughly to the patient that compliance with pre- and post op diet and other recommendations are integral part to improve the chances of successful weight loss and also not following it could end with serious health complications. Some strategies discussed today include but not limited to : 30/30/30 minutes rule, food diary, avoid fast food and packing/planing ahead, & increasing exercise. Also stressed to the patient importance of taking the multivitamins as instructed, otherwise risk significant complications.       The visit today, included any number of the following: Bariatric Preoperative work up/protocols, review of labs, imaging, provider notes, outside hospital records, performing examination/evaluation, counseling patient and/or family, ordering medications/tests, placing referrals and communication with referring physicians, coordination of care; discussing dietary plan/recall with the patient as well with registered dietitian and documentation in the EHR. Of note, the above was done during same day of the actual patient encounter. Toi Daugherty is here for her fourth presurgical visit. Labs were discussed and multiple supplements were sent to the patient. Patient does have dyslipidemia. Encouraged the patient to continue monitoring/limit her dietary fat intake as well continue with the weight loss. Patient still working on quitting smoking. Overall patient is making good progress just need to continue to work on our recommendations. We will see her next month for continued follow-up    We discussed how her excess weight affects her overall health and importance of weight loss, healthy diet and active lifestyle to alleviate those co morbid conditions, otherwise risk deterioration. Morbid Obesity: Body mass index is 44.26 kg/m². [x] Continue to make dietary and lifestyle modifications. [x] Plan for Future laparoscopic sleeve gastrectomy. [x] Return for follow-up next month. Chronic GERD:   [x] Continue to make dietary and lifestyle modifications. [] Continue Omeprazole. [] Continue Famotidine. [x] Plan for EGD to evaluate the stomach. Hyperlipidemia:   [x] Continue to make dietary and lifestyle modifications. [x] Continue to follow up with their PCP for medication management and monitoring. Patient advised that its their responsibility to follow up for studies, referrals and/or labs ordered today.

## 2023-02-09 NOTE — PATIENT INSTRUCTIONS
Patient received dietary handouts and education.     Plan/Recommendations:   - Continue plan  - Complete Support Group

## 2023-02-16 PROBLEM — Z01.810 PREOP CARDIOVASCULAR EXAM: Status: RESOLVED | Noted: 2023-01-17 | Resolved: 2023-02-16

## 2023-02-21 NOTE — PROGRESS NOTES
Patient reached ____ yes  _____ no   VM instructions left ____ yes   phone number ________                                ____ no-office notified          Date ___3/3/23______  Time ___0930____  Arrival __0730 masc____    Nothing to eat or drink after midnight-follow your doctors prep instructions-this may include taking a second dose of your prep after midnight  Responsible adult 25 or older to stay on site while you are here-drive you home-stay with you after  Follow any instructions your doctors office has given you  Bring a complete list of all your medications and supplements including name,dose,how often taken the day of your procedure  If you normally take the following medications in the morning please do so the AM of your procedure with a small sip of water       Heart,blood pressure,seizure,thyroid or breathing medications-use your inhalers-bring any rescue inhalers with you DOS       DO NOT take blood pressure medications ending in \"kandy\" or \"pril\" the AM of procedure or evening prior  Dr Moe Jiménez patients are not to take any medications the AM of surgery  Take half or your normal dose of any long acting insulins the night before your procedure-do not take any diabetic medications the AM of procedure  Follow your doctors instructions regarding stopping or taking  any blood thinners-if you do not have instructions-call them  Any questions call your doctor  Other ______________________________________________________________                [facility] POLICY(subject to change)             The current policy is 2 visitors per patient. There are no children allowed. Mask at discretion of facility. Visiting hours are 8a-8p. Overnight visitors will be at the discretion of the nurse. All policies are subject to change.

## 2023-02-27 ENCOUNTER — TELEPHONE (OUTPATIENT)
Dept: BARIATRICS/WEIGHT MGMT | Age: 43
End: 2023-02-27

## 2023-02-27 NOTE — TELEPHONE ENCOUNTER
Left vm with pt regarding egd on 03/03/2023. Reminded pt of clear liquid diet 24 hours prior/ NPO after midnight/ arrival time.

## 2023-03-03 ENCOUNTER — HOSPITAL ENCOUNTER (OUTPATIENT)
Age: 43
Setting detail: OUTPATIENT SURGERY
Discharge: HOME | End: 2023-03-03
Attending: SURGERY | Admitting: SURGERY
Payer: COMMERCIAL

## 2023-03-03 ENCOUNTER — ANESTHESIA (OUTPATIENT)
Dept: ENDOSCOPY | Age: 43
End: 2023-03-03
Payer: COMMERCIAL

## 2023-03-03 ENCOUNTER — ANESTHESIA EVENT (OUTPATIENT)
Dept: ENDOSCOPY | Age: 43
End: 2023-03-03
Payer: COMMERCIAL

## 2023-03-03 VITALS
DIASTOLIC BLOOD PRESSURE: 82 MMHG | OXYGEN SATURATION: 96 % | HEIGHT: 64 IN | TEMPERATURE: 97.4 F | SYSTOLIC BLOOD PRESSURE: 130 MMHG | WEIGHT: 262 LBS | RESPIRATION RATE: 16 BRPM | BODY MASS INDEX: 44.73 KG/M2 | HEART RATE: 77 BPM

## 2023-03-03 DIAGNOSIS — K21.9 GASTROESOPHAGEAL REFLUX DISEASE, UNSPECIFIED WHETHER ESOPHAGITIS PRESENT: ICD-10-CM

## 2023-03-03 LAB — HCG(URINE) PREGNANCY TEST: NEGATIVE

## 2023-03-03 PROCEDURE — 88342 IMHCHEM/IMCYTCHM 1ST ANTB: CPT

## 2023-03-03 PROCEDURE — 43239 EGD BIOPSY SINGLE/MULTIPLE: CPT | Performed by: SURGERY

## 2023-03-03 PROCEDURE — 7100000010 HC PHASE II RECOVERY - FIRST 15 MIN: Performed by: SURGERY

## 2023-03-03 PROCEDURE — 88305 TISSUE EXAM BY PATHOLOGIST: CPT

## 2023-03-03 PROCEDURE — 2500000003 HC RX 250 WO HCPCS: Performed by: NURSE ANESTHETIST, CERTIFIED REGISTERED

## 2023-03-03 PROCEDURE — 3700000000 HC ANESTHESIA ATTENDED CARE: Performed by: SURGERY

## 2023-03-03 PROCEDURE — 7100000011 HC PHASE II RECOVERY - ADDTL 15 MIN: Performed by: SURGERY

## 2023-03-03 PROCEDURE — 2709999900 HC NON-CHARGEABLE SUPPLY: Performed by: SURGERY

## 2023-03-03 PROCEDURE — 2580000003 HC RX 258: Performed by: ANESTHESIOLOGY

## 2023-03-03 PROCEDURE — 84703 CHORIONIC GONADOTROPIN ASSAY: CPT

## 2023-03-03 PROCEDURE — 3609012400 HC EGD TRANSORAL BIOPSY SINGLE/MULTIPLE: Performed by: SURGERY

## 2023-03-03 PROCEDURE — 6360000002 HC RX W HCPCS: Performed by: NURSE ANESTHETIST, CERTIFIED REGISTERED

## 2023-03-03 RX ORDER — LIDOCAINE HYDROCHLORIDE 20 MG/ML
INJECTION, SOLUTION EPIDURAL; INFILTRATION; INTRACAUDAL; PERINEURAL PRN
Status: DISCONTINUED | OUTPATIENT
Start: 2023-03-03 | End: 2023-03-03 | Stop reason: SDUPTHER

## 2023-03-03 RX ORDER — PROPOFOL 10 MG/ML
INJECTION, EMULSION INTRAVENOUS PRN
Status: DISCONTINUED | OUTPATIENT
Start: 2023-03-03 | End: 2023-03-03 | Stop reason: SDUPTHER

## 2023-03-03 RX ORDER — SODIUM CHLORIDE 9 MG/ML
INJECTION, SOLUTION INTRAVENOUS CONTINUOUS
Status: DISCONTINUED | OUTPATIENT
Start: 2023-03-03 | End: 2023-03-03 | Stop reason: HOSPADM

## 2023-03-03 RX ADMIN — PROPOFOL 50 MG: 10 INJECTION, EMULSION INTRAVENOUS at 09:47

## 2023-03-03 RX ADMIN — PROPOFOL 50 MG: 10 INJECTION, EMULSION INTRAVENOUS at 09:49

## 2023-03-03 RX ADMIN — PROPOFOL 50 MG: 10 INJECTION, EMULSION INTRAVENOUS at 09:48

## 2023-03-03 RX ADMIN — SODIUM CHLORIDE: 9 INJECTION, SOLUTION INTRAVENOUS at 08:55

## 2023-03-03 RX ADMIN — LIDOCAINE HYDROCHLORIDE 50 MG: 20 INJECTION, SOLUTION EPIDURAL; INFILTRATION; INTRACAUDAL; PERINEURAL at 09:47

## 2023-03-03 ASSESSMENT — PAIN SCALES - GENERAL: PAINLEVEL_OUTOF10: 0

## 2023-03-03 ASSESSMENT — LIFESTYLE VARIABLES: SMOKING_STATUS: 1

## 2023-03-03 NOTE — ANESTHESIA POSTPROCEDURE EVALUATION
Department of Anesthesiology  Postprocedure Note    Patient: Mike Everett  MRN: [id number]  YOB: 1980  Date of evaluation: 3/3/2023      Procedure Summary     Date: 03/03/23 Room / Location: [address]    Anesthesia Start: 6084 Anesthesia Stop: 7396    Procedure: EGD BIOPSY (Abdomen) Diagnosis:       Gastroesophageal reflux disease, unspecified whether esophagitis present      (Gastroesophageal reflux disease, unspecified whether esophagitis present [K21.9])    Surgeons: Cassie Mcgraw MD Responsible Provider: Ramon Toney MD    Anesthesia Type: MAC ASA Status: 3          Anesthesia Type: No value filed.     Unique Phase I: Unique Score: 10    Unique Phase II: Unique Score: 9      Anesthesia Post Evaluation    Patient location during evaluation: PACU  Patient participation: complete - patient participated  Level of consciousness: awake and alert  Airway patency: patent  Nausea & Vomiting: no vomiting and no nausea  Complications: no  Cardiovascular status: hemodynamically stable  Respiratory status: acceptable  Hydration status: stable

## 2023-03-03 NOTE — DISCHARGE INSTRUCTIONS
ENDOSCOPY DISCHARGE INSTRUCTIONS    You may experience some lightheadedness for the next several hours. Plan on quiet relaxation for the rest of today. A responsible adult needs to stay with you today. Because of the medications you received today-do not drive,operate machinery,or sign any contractual agreement for the next 24 hours. Do not drink any alcoholic beverages or take any unprescribed medications tonight. Eat bland food and avoid anything greasy or spicy initially-progress to your normal diet gradually. Diet restrictions as instructed. If you have any of the following problems, notify your physician or return to the hospital emergency room : fever, chills, excessive bleeding, excessive vomiting, difficulty swallowing, uncontrolled pain, increased abdominal distention, shortness of breath or any other problems. If you have a sore throat, you may use lozenges or salt water gargles. Please call Dr. Ivan Kent office in 5 business days for biopsy results [phone]. ANESTHESIA DISCHARGE INSTRUCTIONS    Wear your seatbelt home. You are under the influence of drugs-do not drink alcohol,drive,operate machinery,or make any important decisions or sign any legal documentsfor 24 hours  Children should not ride [employer] or play on gym sets  for 24 hours after surgery. A responsible adult needs to be with you for 24 hours. You may experience lightheadedness,dizziness,or sleepiness following surgery. Rest at home today- increase activity as tolerated. Progress slowly to a regular diet unless your physician has instructed you otherwise. Drink plenty of water. If nausea becomes a problem call your physician. Coughing,sore throat,and muscle aches are other side effects of anesthesia,and should improve with time. Do not drive,operate machinery while taking narcotics.

## 2023-03-03 NOTE — ANESTHESIA PRE PROCEDURE
Department of Anesthesiology  Preprocedure Note       Name:  Brandt Mills   Age:  40 y.o.  :  1980                                          MRN:  [id number]         Date:  3/3/2023      Surgeon: Carmen Stevenson): Yari Rao MD    Procedure: Procedure(s):  EGD DIAGNOSTIC ONLY    Medications prior to admission:   Prior to Admission medications    Medication Sig Start Date End Date Taking? Authorizing Provider   folic acid (V-R FOLIC ACID) 737 MCG tablet Take 1 tablet by mouth daily 23   Yari Rao MD   ferrous sulfate (IRON 325) 325 (65 Fe) MG tablet Take 1 tablet by mouth 2 times daily (with meals) 23   Yari Rao MD   Cholecalciferol 50 MCG (2000 UT) TABS Take 1 tablet by mouth daily Take 1 tablet by mouth daily.  Start this medication after you finish the weekly regimen  Patient not taking: Reported on 3/3/2023 2/9/23   Yari Rao MD   vitamin D (CHOLECALCIFEROL) 67785 UNIT CAPS Take 1 capsule by mouth once a week 2/9/23 5/10/23  Yari Rao MD   Multiple Vitamins-Minerals (THERAPEUTIC MULTIVITAMIN-MINERALS) tablet Take 1 tablet by mouth daily    Historical Provider, MD   Cholecalciferol (VITAMIN D3) 50 MCG (2000 UT) CAPS Take by mouth  Patient not taking: Reported on 3/3/2023    Historical Provider, MD   Buprenorphine HCl (SUBUTEX SL) Place under the tongue    Historical Provider, MD   diphenhydrAMINE HCl, TOPICAL, (BENADRYL ITCH STOPPING) 2 % GEL Apply 10 mLs topically in the morning, at noon, and at bedtime  Patient not taking: Reported on 3/3/2023 5/13/22   Tali Pa MD   EPINEPHrine (EPIPEN 2-LARRY) 0.3 MG/0.3ML SOAJ injection Inject 0.3 mLs into the muscle once for 1 dose Use as directed for allergic reaction 22  Tali Pa MD   nicotine (NICODERM CQ) 21 MG/24HR  22   Historical Provider, MD   buPROPion (WELLBUTRIN XL) 150 MG extended release tablet  22   Historical Provider, MD   famotidine (PEPCID) 20 MG tablet Take 1 tablet by mouth 2 times daily for 7 days 5/10/22 1/17/23  Kaycee To MD       Current medications:    Current Facility-Administered Medications   Medication Dose Route Frequency Provider Last Rate Last Admin   • 0.9 % sodium chloride infusion   IntraVENous Continuous Owen Fuentes MD         Facility-Administered Medications Ordered in Other Encounters   Medication Dose Route Frequency Provider Last Rate Last Admin   • 0.9 % sodium chloride infusion   IntraVENous Continuous Susana Alves MD           Allergies:    Allergies   Allergen Reactions   • Toradol [Ketorolac Tromethamine] Other (See Comments)     \"restless legs\"       Problem List:    Patient Active Problem List   Diagnosis Code   • Chronic GERD K21.9   • Morbid obesity with BMI of 45.0-49.9, adult ([facility]) E66.01, Z68.42   • Dyslipidemia (high LDL; low HDL) E78.5       Past Medical History:        Diagnosis Date   • Anxiety    • Bile leak 2014   • GERD (gastroesophageal reflux disease)    • Hypoxemia 2014   • Leg pain, bilateral    • Migraine    • Overweight    • Pleural effusion 2014       Past Surgical History:        Procedure Laterality Date   •  SECTION      x 5   • OTHER SURGICAL HISTORY  2014    laparoscopic cholecystectomy   • TUBAL LIGATION     • WISDOM TOOTH EXTRACTION         Social History:    Social History     Tobacco Use   • Smoking status: Every Day     Packs/day: 1.50     Years: 19.00     Pack years: 28.50     Types: Cigarettes   • Smokeless tobacco: Never   • Tobacco comments:     Down to few cigs per day starting 2022   Substance Use Topics   • Alcohol use: Not Currently     Comment: occasional                                Ready to quit: Not Answered  Counseling given: Not Answered  Tobacco comments: Down to few cigs per day starting 2022      Vital Signs (Current):   Vitals:    23 1245   Weight: 260 lb (117.9 kg)   Height: 5' 5\" (1.651 m)                                              BP Readings from  Last 3 Encounters:   02/09/23 125/80   01/19/23 138/72   01/17/23 113/75       NPO Status: Time of last liquid consumption: 2130                        Time of last solid consumption: 0000                        Date of last liquid consumption: 03/02/23                        Date of last solid food consumption: 03/01/23    BMI:   Wt Readings from Last 3 Encounters:   02/21/23 260 lb (117.9 kg)   02/09/23 266 lb (120.7 kg)   01/19/23 274 lb (124.3 kg)     Body mass index is 43.27 kg/m². CBC:   Lab Results   Component Value Date/Time    WBC 10.6 01/19/2023 02:24 PM    RBC 4.77 01/19/2023 02:24 PM    HGB 12.6 01/19/2023 02:24 PM    HCT 38.5 01/19/2023 02:24 PM    MCV 80.7 01/19/2023 02:24 PM    RDW 15.2 01/19/2023 02:24 PM     01/19/2023 02:24 PM       CMP:   Lab Results   Component Value Date/Time     01/19/2023 02:24 PM    K 4.0 01/19/2023 02:24 PM     01/19/2023 02:24 PM    CO2 25 01/19/2023 02:24 PM    BUN 4 01/19/2023 02:24 PM    CREATININE 0.6 01/19/2023 02:24 PM    GFRAA >60 04/04/2017 10:58 AM    GFRAA >60 06/29/2012 06:18 PM    AGRATIO 1.4 01/19/2023 02:24 PM    LABGLOM >60 01/19/2023 02:24 PM    GLUCOSE 79 01/19/2023 02:24 PM    PROT 6.9 01/19/2023 02:24 PM    PROT 7.8 06/29/2012 06:18 PM    CALCIUM 9.1 01/19/2023 02:24 PM    BILITOT <0.2 01/19/2023 02:24 PM    ALKPHOS 81 01/19/2023 02:24 PM    AST 19 01/19/2023 02:24 PM    ALT 11 01/19/2023 02:24 PM       POC Tests: No results for input(s): POCGLU, POCNA, POCK, POCCL, POCBUN, POCHEMO, POCHCT in the last 72 hours.     Coags:   Lab Results   Component Value Date/Time    PROTIME 12.9 01/19/2023 02:24 PM    INR 0.99 01/19/2023 02:24 PM       HCG (If Applicable):   Lab Results   Component Value Date    PREGTESTUR Negative 03/03/2023        ABGs: No results found for: PHART, PO2ART, BFI0ROC, HXA8QQS, BEART, X3BXTZVY     Type & Screen (If Applicable):  No results found for: LABABO, LABRH    Drug/Infectious Status (If Applicable):  No results found for: HIV, HEPCAB    COVID-19 Screening (If Applicable): No results found for: COVID19        Anesthesia Evaluation  Patient summary reviewed and Nursing notes reviewed no history of anesthetic complications:   Airway: Mallampati: III  TM distance: <3 FB   Neck ROM: full  Mouth opening: > = 3 FB   Dental:    (+) poor dentition      Pulmonary:normal exam  breath sounds clear to auscultation  (+) current smoker    (-) COPD, asthma and sleep apnea                           Cardiovascular:    (+) hyperlipidemia    (-) hypertension, past MI, CAD, CABG/stent, dysrhythmias,  angina and  CHF    ECG reviewed  Rhythm: regular  Rate: normal                    Neuro/Psych:   (+) headaches: migraine headaches, depression/anxiety    (-) seizures, TIA and CVA           GI/Hepatic/Renal:   (+) GERD:, morbid obesity     (-) liver disease and no renal disease       Endo/Other: Negative Endo/Other ROS       (-) diabetes mellitus, hypothyroidism, hyperthyroidism               Abdominal:             Vascular: Other Findings:           Anesthesia Plan      MAC     ASA 3       Induction: intravenous. Anesthetic plan and risks discussed with patient. Plan discussed with CRNA.                     Markus Remy MD   3/3/2023

## 2023-03-03 NOTE — H&P
Department of [facility] [address] Physicians   [employer]  Attending Pre-operative History and Physical      DIAGNOSIS:  Obesity    INDICATION:  Pre-op    PROCEDURE:  EGD    CHIEF COMPLAINT:  Obesity    History Obtained From:  patient    HISTORY OF PRESENT ILLNESS:    The patient is a 40 y.o. female with significant past medical history of   Patient Active Problem List   Diagnosis    Chronic GERD    Morbid obesity with BMI of 45.0-49.9, adult ([address]    Dyslipidemia (high LDL; low HDL)      who presents for pre-op EGD    Past Medical History:        Diagnosis Date    Anxiety     Bile leak 2014    GERD (gastroesophageal reflux disease)     Hypoxemia 2014    Leg pain, bilateral     Migraine     Overweight     Pleural effusion 2014     Past Surgical History:        Procedure Laterality Date     SECTION      x 5    OTHER SURGICAL HISTORY  2014    laparoscopic cholecystectomy    TUBAL LIGATION      WISDOM TOOTH EXTRACTION       Medications Prior to Admission:   Medications Prior to Admission: folic acid (V-R FOLIC ACID) 426 MCG tablet, Take 1 tablet by mouth daily  ferrous sulfate (IRON 325) 325 (65 Fe) MG tablet, Take 1 tablet by mouth 2 times daily (with meals)  Cholecalciferol 50 MCG (2000 UT) TABS, Take 1 tablet by mouth daily Take 1 tablet by mouth daily.  Start this medication after you finish the weekly regimen (Patient not taking: Reported on 3/3/2023)  vitamin D (CHOLECALCIFEROL) 54569 UNIT CAPS, Take 1 capsule by mouth once a week  Multiple Vitamins-Minerals (THERAPEUTIC MULTIVITAMIN-MINERALS) tablet, Take 1 tablet by mouth daily  Cholecalciferol (VITAMIN D3) 50 MCG (2000 UT) CAPS, Take by mouth (Patient not taking: Reported on 3/3/2023)  Buprenorphine HCl (SUBUTEX SL), Place under the tongue  diphenhydrAMINE HCl, TOPICAL, (BENADRYL ITCH STOPPING) 2 % GEL, Apply 10 mLs topically in the morning, at noon, and at bedtime (Patient not taking: Reported on 3/3/2023)  EPINEPHrine (EPIPEN 2-LARRY) 0.3 MG/0.3ML SOAJ injection, Inject 0.3 mLs into the muscle once for 1 dose Use as directed for allergic reaction  nicotine (NICODERM CQ) 21 MG/24HR,   buPROPion (WELLBUTRIN XL) 150 MG extended release tablet,   famotidine (PEPCID) 20 MG tablet, Take 1 tablet by mouth 2 times daily for 7 days    Allergies: Toradol [ketorolac tromethamine]    Social History:   TOBACCO:   reports that she has been smoking cigarettes. She has a 28.50 pack-year smoking history. She has never used smokeless tobacco.  ETOH:   reports that she does not currently use alcohol. Family History:   History reviewed. No pertinent family history. REVIEW OF SYSTEMS:    Review of Systems - History obtained from the patient  General ROS: negative  Psychological ROS: negative  Ophthalmic ROS: negative  Neurological ROS: negative  ENT ROS: negative  Allergy and Immunology ROS: negative  Hematological and Lymphatic ROS: negative  Endocrine ROS: negative  Breast ROS: negative  Respiratory ROS: negative  Cardiovascular ROS: negative  Gastrointestinal ROS:negative  Genito-Urinary ROS: negative  Musculoskeletal ROS: negative   Skin ROS: negative      PHYSICAL EXAM:      BP (!) 138/90   Pulse 85   Temp 97.9 °F (36.6 °C) (Temporal)   Resp 20   Ht 5' 4\" (1.626 m)   Wt 262 lb (118.8 kg)   LMP 02/12/2023   SpO2 100%   BMI 44.97 kg/m²  I      Physical Exam   Vitals Reviewed   Constitutional: Patient is oriented to person, place, and time. Patient appears well-developed and well-nourished. Patient is active and cooperative. Non-toxic appearance. No distress. HENT:   Head: Normocephalic and atraumatic. Head is without abrasion and without laceration. Hair is normal.   Right Ear: External ear normal. No lacerations. No drainage, swelling . Left Ear: External ear normal. No lacerations. No drainage, swelling. Nose: Nose normal. No nose lacerations or nasal deformity.    Eyes: Conjunctivae, EOM and lids are normal. Right eye exhibits no discharge. No foreign body present in the right eye. Left eye exhibits no discharge. No foreign body present in the left eye. No scleral icterus. Neck: Trachea normal and normal range of motion. No JVD present. Pulmonary/Chest: Effort normal. No accessory muscle usage or stridor. No apnea. No respiratory distress. Cardiovascular: Normal rate and no JVD. Abdominal: Normal appearance. Patient exhibits no distension. Musculoskeletal: Normal range of motion. Patient exhibits no edema. Neurological: Patient is alert and oriented to person, place, and time. Patient has normal strength. GCS eye subscore is 4. GCS verbal subscore is 5. GCS motor subscore is 6. Skin: Skin is warm and dry. No abrasion and no rash noted. Patient is not diaphoretic. No cyanosis or erythema. Psychiatric: Patient has a normal mood and affect.  Speech is normal and behavior is normal. Cognition and memory are normal.       DATA:  CBC:   Lab Results   Component Value Date/Time    WBC 10.6 01/19/2023 02:24 PM    RBC 4.77 01/19/2023 02:24 PM    HGB 12.6 01/19/2023 02:24 PM    HCT 38.5 01/19/2023 02:24 PM    MCV 80.7 01/19/2023 02:24 PM    MCH 26.5 01/19/2023 02:24 PM    MCHC 32.8 01/19/2023 02:24 PM    RDW 15.2 01/19/2023 02:24 PM     01/19/2023 02:24 PM    MPV 8.8 01/19/2023 02:24 PM     CMP:    Lab Results   Component Value Date/Time     01/19/2023 02:24 PM    K 4.0 01/19/2023 02:24 PM     01/19/2023 02:24 PM    CO2 25 01/19/2023 02:24 PM    BUN 4 01/19/2023 02:24 PM    CREATININE 0.6 01/19/2023 02:24 PM    GFRAA >60 04/04/2017 10:58 AM    GFRAA >60 06/29/2012 06:18 PM    AGRATIO 1.4 01/19/2023 02:24 PM    LABGLOM >60 01/19/2023 02:24 PM    GLUCOSE 79 01/19/2023 02:24 PM    PROT 6.9 01/19/2023 02:24 PM    PROT 7.8 06/29/2012 06:18 PM    LABALBU 4.0 01/19/2023 02:24 PM    CALCIUM 9.1 01/19/2023 02:24 PM    BILITOT <0.2 01/19/2023 02:24 PM    ALKPHOS 81 01/19/2023 02:24 PM    AST 19 01/19/2023 02:24 PM    ALT 11 01/19/2023 02:24 PM       ASSESSMENT AND PLAN:      Obesity: Body mass index is 44.97 kg/m². [x] Continue to make dietary and lifestyle modifications. [x] Plan for Future laparoscopic sleeve gastrectomy. [x] Return for follow-up. Chronic GERD:   [x] Continue to make dietary and lifestyle modifications. [] Continue Omeprazole. [] Continue Famotidine. [x] Plan for EGD to evaluate the stomach. I did explain thoroughly to the patient that compliance with pre- and post op diet and other recommendations are integral part to improve the chances of successful weight loss and also not following it could end with serious health complications. Some strategies discussed today include but not limited to : 30/30/30 minutes rule, food diary, avoid fast food and packing/planing ahead, & increasing exercise. 1.  Patient is a 40 y.o. female with above specified procedure planned EGD with deep sedation  2. Procedure options, risks and benefits reviewed with patient. Patient expresses understanding.       Electronically signed by Timmy Phoenix MD , FACS, FASMBS  on 3/3/2023 at 9:30 AM

## 2023-03-03 NOTE — PROGRESS NOTES
?[facility]   OPERATIVE REPORT   ORTHOPAEDIC SURGERY   PROVIDER: DR. KATHERINE SANDHU    PATIENT INFORMATION   Germaine Frank 18 y.o. female 2000   MRN: [id number]   LOCATION: [facility]     DATE OF PROCEDURE: 4/17/2019     PREOPERATIVE DIAGNOSES:   1.  Right disabled throwing shoulder with pain  2.  Right shoulder superior labral anterior posterior tear  3.  Right shoulder pectoralis minor syndrome    POSTOPERATIVE DIAGNOSES:   1.  Right disabled throwing shoulder with pain  2.  Right shoulder superior labral anterior posterior tear with posterior inferior extension, concealed type VIII with positive peel back and loss of posterior chondrolabral containment  3.  Right shoulder pectoralis minor syndrome    PROCEDURES PERFORMED:    1.  Right shoulder arthroscopic SLAP repair (CPT 76338)  2.  Right shoulder arthroscopic posterior labral repair with limited capsulorrhaphy (CPT 26249)  3. Shoulder arthroscopic extensive debridement (anterior, posterior glenohumeral joint, subacromial space) (CPT 91648)  4. Shoulder arthroscopic pectoralis minor tenotomy (CPT 38218, complex -22 modifier)  5. Shoulder arthroscopic brachial plexus exploration and neuroplasty (CPT 16574)    COMPLEX PROCEDURE:    There was an altered surgical field. There was abnormal anatomy. There was major scarring and adhesions around the pectoralis minor and underlying brachial plexus which increased the complexity of the case.  There was increased time, intensity and technical difficulty of the procedure, severity of the patient's condition and mental effort required.  This was a more complicated procedure than usual requiring advanced arthroscopic skill in order to safely and technically perform it.      Surgeon(s) and Role:     * THEO Sandhu MD - Primary     * Schuyler Mac MD - Fellow     * SMA Herbert    ANESTHESIA: General with interscalene catheter    ESTIMATED BLOOD LOSS: 15 cc    IMPLANTS:   Implant  Pt received into bay 1. Phase II. Pt drowsy, yet oriented. Room air. Report obtained. Vss. Pt stable. Name Type Inv. Item Serial No.  Lot No. LRB No. Used   ANCHOR BIOCOMPOSITE 2.9X15.5MM - [id number]  ANCHOR BIOCOMPOSITE 2.9X15.5MM  [employer] [id number] Right 3        FINDINGS: Superior labral anterior posterior tear with posterior inferior extension, concealed type VIII with positive peel back and loss of posterior chondrolabral containment.  Tight pectoralis minor insertion on the coracoid with underlying adhesions around the brachial plexus.    SPECIMENS:   Specimen (12h ago, onward)    None        COMPLICATIONS: None.     INTRAOPERATIVE COUNTS: Correct.     PROPHYLACTIC IV ANTIBIOTICS: Given per [facility] Protocol.    INDICATIONS FOR PROCEDURE: Berenice is a competitive  currently committed to play for [employer] in college. She has struggled with throwing shoulder pain for nearly 1 year.  Workup and treatment has been extensive and she has seen multiple other providers.  She has had a few rounds of formal physical therapy.  This has become quite frustrating for her.  Currently unable to participate in softball or volleyball.  She plays multiple overhead sports.  Initial exam findings were most consistent with a symptomatic SLAP tear in the setting of symmetric shoulder total arc of motion. Correlating findings were seen also on MRI. She does have intermittent secondary biceps related irritation but the posterior superior pain has been remained one of her biggest issues with throwing.  She has mentioned a few episodes of intermittent tingling and numbness down the arm as well.  Upon examination and discussion in the preoperative holding area today, both she and her mother provide additional history of a recent prominent episode of generalized swelling involving the right arm with numbness and tingling involving predominantly the ulnar nerve distribution when attempting to throw after my last visit with her in clinic.  She also provide the history that she has had similar such complaints in the  past in addition to the posterior superior shoulder pain. No neck pain. No previous trauma such as a fall or motor vehicle accident.  No shoulder instability symptoms. I did discuss this additional information with them and performed an examination.  The patient localized pain both preop and on the day of surgery also over the anterior shoulder, rotator interval, coracoid, and pectoralis minor.  She describes this area as tender to touch with subjective local swelling at times. No posterior trapezius or cervical neck complaints.  No headaches.  In light of this additional information, I discussed that I have concern that there is likely some contribution of a  pectoralis minor syndrome as well. This is largely a clinical diagnosis and sometimes a diagnostic injection can be helpful.  This also makes sense in light of the overall clinical picture of her repetitive overhead sports participation.  We do know that the SLAP tear is symptomatic for her.  Rather than postpone things, we discussed proceeding with the addition of a probable arthroscopic pectoralis minor release to address any brachial plexus compression and neurogenic complaints.  The details of such were discussed to include the associated potential risk for neurovascular injury.  They verbalized understanding and wished to proceed.  She and her mother understand that regardless of treatment, return to same level play can be as low as 60% in this case. Risk of surgery also includes continued or recurrent neurogenic or thoracic outlet complaint.     DETAILS OF PROCEDURE: The patient and her mother were met in the preoperative holding area where the operative site was identified and marked.  Final informed consent was obtained.  The anesthesia team performed an interscalene block.  The patient was then wheeled back to the operating room and placed supine on the table.  General anesthesia was administered.     Examination under anesthesia of the operative  shoulder with comparison to the contralateral side demonstrated: Full passive range overhead with symmetric bilateral internal rotation in the abducted position of approximately 60 degrees. No evidence of any significant posterior capsular tightness.  Total arc of motion symmetric to the contralateral side. Grade 0 anterior load and shift testing.  Physiologic grade 1 posterior load and shift testing comparable to the contralateral side.     The patient was then placed in the left lateral decubitus position.  All bony prominences were well padded.     The right upper extremity was then prepped and draped in the usual sterile fashion for arthroscopic shoulder surgery. A Spider arm positioner was used during the procedure.  A formal timeout was then performed to confirm the patient's identity, correct operative site, and planned operative procedure.  It was also confirmed that the patient had received a preoperative antibiotic.     A standard posterior viewing portal was created.  The arthroscope was introduced. Diagnostic arthroscopy was then performed. An anterior-superior portal was created and probe introduced in the joint. The biceps long head was probed with the intertubercular groove portion brought into the joint. There was no appreciable tendinitis or tendinopathy. A [place] complex was seen, along with a meniscoid variant superior labrum. Otherwise, there was normal attachment of the anterior-inferior labrum starting at the 3 o'clock position extending to 6 o'clock. Probing of the anterior inferior labrum demonstrated no fissuring or evidence of trauma. There was no visible biceps pulley lesion.  The subscapularis was probed and found to be intact.  The undersurface of the rotator cuff and articular cartilage were intact. There was a negative drive-through sign of the glenohumeral joint and there was no significant redundancy of the inferior capsular pouch.  The anterior band of the IGHL appeared well  tensioned and the humeral attachment points of the anterior and posterior IGHL were confirmed to be intact. An anterior superior cannula was introduced. The arthroscope was then switched into this portal and the posterior glenohumeral joint was visualized. Notably, there was fissuring along the chondrolabral margin of the posterior superior labral region from 10 o'clock to 8 o'clock with evidence of shear trauma and loss of chondrolabral containment extending down to the 7 o'clock position. The posterior superior labrum was also rolled medially off the glenoid articular margin and there was a positive peel back mechanism at the biceps root attachment consistent with a posterior SLAP tear.  A probe was introduced and easily sunk deep to the articular margin at the site of this tear consistent with a concealed posterior SLAP tear with extension into a Ginger lesion.  This appeared to extend distally into the posterior band IGHL, which was completely under-tensioned.The posterior capsular tissue was not thickened.  It was determined that this was a clinically significant labral injury.      Using the percutaneous labral kit,  a posterior inferior portal was placed for access to the posterior aspect of the joint. This was just off the posterior margin of the infraspinatus. A posterior cannula was introduced in a mid glenoid anterior portal was created also with placement of a cannula.  A combination of the arthroscopic shaver, angled and ball rasps were used to further separate the torn labrum from the glenoid margin and to decorticate the glenoid in preparation for the repair. Extensive debridement included chondral labral debridement with a shaver. Following adequate preparation, three Arthrex Labraltapes were then passed around the tear and fixated at the glenoid rim using a total of 3 PushLock knotless anchors, at the 11, 9, and 7 o'clock positions. Care was taken not to over-constrain posteriorly and no  significant capsular shift was performed. After SLAP and posterior labral repair, probing demonstrated a stable posterior labrum and no over constraint of the biceps long head attachment.  The posterior band of the IGHL was re-tensioned effectively.  A shaver was then introduced to remove all nonviable tissue and to perform a final limited debridement.  No posterior capsule release was needed.     Attention was then turned towards exposure of the coracoid.  The [place] complex was preserved.  A thermal ablator was used to dissect through the CHL for and rotator interval for exposure. Landmarks around the coracoid were dissected to include the conjoined tendon, CA ligament, and CC ligaments. These were all identified and preserved.  The coracoid was further exposed from underneath and also over the superior aspect.  The anterior superior portal was used to gain access as well for this exposure. The extensive debridement included partial subacromial bursectomy to gain access.  A medial coracoid portal was created to introduce the thermal ablator over the top of the exposed coracoid.  A 70 degree scope was used to visualize the coracoid both from the posterior portal and the anterior superior lateral portal. The pectoralis minor insertion over the medial coracoid was seen. This structure was found to be taut to probing and fairly tight.  The thermal ablator was carefully used to release the pectoralis minor tendon from the coracoid with care taken to stay on bone during this dissection.  The pectoralis minor tenotomy was completed and afterwards the more medial neurovascular bundle was clearly seen and decompressed.  A small blunt-tipped probe was used to carefully dissect through this area for identification of the musculocutaneous nerve and for neuroplasty.  Again, great care was taken to avoid any neurovascular injury during this procedure.      The arthroscope was removed.  Scope portals were closed using  3-0 Monocryl suture followed by Mastisol and steri-strips. Sterile dressings were applied.  A cold wrap was placed over the shoulder and the arm was placed in a neutral Gunslinger sling and pillow for immobilization. Compartments were soft postoperatively.  2+ radial pulse confirmed.  The patient was then extubated and transferred to the postanesthesia care unit in stable condition.     POSTOPERATIVE PLAN: The patient will remain in his brace for approximately 5-6 weeks.  Plan to follow a posterior SLAP/posterior labral repair rehab protocol. Limit cross chest adduction and internal rotation in particular within confines of the program. Passive motion only to start. Periscapular control and strengthening is vital to her rehabilitation. This should also include attention to core strength, balance, and hip and lower extremity mobility and strength.

## 2023-03-08 ENCOUNTER — HOSPITAL ENCOUNTER (EMERGENCY)
Age: 43
Discharge: HOME | End: 2023-03-08
Attending: EMERGENCY MEDICINE
Payer: COMMERCIAL

## 2023-03-08 VITALS
OXYGEN SATURATION: 97 % | SYSTOLIC BLOOD PRESSURE: 171 MMHG | DIASTOLIC BLOOD PRESSURE: 96 MMHG | RESPIRATION RATE: 18 BRPM | TEMPERATURE: 97.7 F | HEART RATE: 66 BPM

## 2023-03-08 DIAGNOSIS — K21.9 CHRONIC GERD: Primary | ICD-10-CM

## 2023-03-08 DIAGNOSIS — K04.7 DENTAL INFECTION: ICD-10-CM

## 2023-03-08 DIAGNOSIS — E66.01 MORBID OBESITY WITH BMI OF 45.0-49.9, ADULT (HCC): ICD-10-CM

## 2023-03-08 DIAGNOSIS — K08.89 PAIN, DENTAL: Primary | ICD-10-CM

## 2023-03-08 PROCEDURE — 6370000000 HC RX 637 (ALT 250 FOR IP): Performed by: EMERGENCY MEDICINE

## 2023-03-08 PROCEDURE — 99283 EMERGENCY DEPT VISIT LOW MDM: CPT

## 2023-03-08 RX ORDER — NAPROXEN 500 MG/1
500 TABLET ORAL 2 TIMES DAILY PRN
Qty: 30 TABLET | Refills: 0 | Status: SHIPPED | OUTPATIENT
Start: 2023-03-08 | End: 2023-03-23

## 2023-03-08 RX ORDER — LIDOCAINE HYDROCHLORIDE 20 MG/ML
15 SOLUTION OROPHARYNGEAL PRN
Qty: 100 ML | Refills: 0 | Status: SHIPPED | OUTPATIENT
Start: 2023-03-08 | End: 2023-03-13

## 2023-03-08 RX ORDER — AMOXICILLIN AND CLAVULANATE POTASSIUM 875; 125 MG/1; MG/1
1 TABLET, FILM COATED ORAL 2 TIMES DAILY
Qty: 14 TABLET | Refills: 0 | Status: SHIPPED | OUTPATIENT
Start: 2023-03-08 | End: 2023-03-15

## 2023-03-08 RX ORDER — OXYCODONE HYDROCHLORIDE 5 MG/1
5 TABLET ORAL ONCE
Status: COMPLETED | OUTPATIENT
Start: 2023-03-08 | End: 2023-03-08

## 2023-03-08 RX ORDER — NAPROXEN 500 MG/1
500 TABLET ORAL ONCE
Status: COMPLETED | OUTPATIENT
Start: 2023-03-08 | End: 2023-03-08

## 2023-03-08 RX ORDER — OXYCODONE HYDROCHLORIDE 5 MG/1
5 TABLET ORAL EVERY 6 HOURS PRN
Qty: 12 TABLET | Refills: 0 | Status: SHIPPED | OUTPATIENT
Start: 2023-03-08 | End: 2023-03-11

## 2023-03-08 RX ORDER — LIDOCAINE HYDROCHLORIDE 20 MG/ML
15 SOLUTION OROPHARYNGEAL ONCE
Status: COMPLETED | OUTPATIENT
Start: 2023-03-08 | End: 2023-03-08

## 2023-03-08 RX ORDER — AMOXICILLIN AND CLAVULANATE POTASSIUM 875; 125 MG/1; MG/1
1 TABLET, FILM COATED ORAL ONCE
Status: COMPLETED | OUTPATIENT
Start: 2023-03-08 | End: 2023-03-08

## 2023-03-08 RX ADMIN — OXYCODONE HYDROCHLORIDE 5 MG: 5 TABLET ORAL at 22:58

## 2023-03-08 RX ADMIN — Medication 15 ML: at 22:59

## 2023-03-08 RX ADMIN — NAPROXEN 500 MG: 500 TABLET ORAL at 22:57

## 2023-03-08 RX ADMIN — AMOXICILLIN AND CLAVULANATE POTASSIUM 1 TABLET: 875; 125 TABLET, FILM COATED ORAL at 22:57

## 2023-03-08 ASSESSMENT — PAIN SCALES - GENERAL
PAINLEVEL_OUTOF10: 7
PAINLEVEL_OUTOF10: 7

## 2023-03-08 ASSESSMENT — PAIN - FUNCTIONAL ASSESSMENT: PAIN_FUNCTIONAL_ASSESSMENT: 0-10

## 2023-03-08 ASSESSMENT — PAIN DESCRIPTION - LOCATION: LOCATION: MOUTH

## 2023-03-08 ASSESSMENT — PAIN DESCRIPTION - ORIENTATION: ORIENTATION: LEFT

## 2023-03-08 NOTE — Clinical Note
Mohinder Dawkins was seen and treated in our emergency department on 3/8/2023. She may return to work on . If you have any questions or concerns, please don't hesitate to call.       [address] [facility], DO

## 2023-03-09 NOTE — ED PROVIDER NOTES
EMERGENCY DEPARTMENT PROVIDER NOTE      CHIEF COMPLAINT  Dental Pain (States L upper tooth pain since Saturday )        HISTORY OF PRESENT ILLNESS  Pancho Gordon  is a 40 y.o. female with a significant PMHX of poor dentition with a dentist appointment in 2 weeks, presenting emergency department today with concerns of left upper tooth pain. Says she had an EGD last week, and she feels like something that happened during that procedure irritated her teeth. She has multiple cavities and fillings on that side. Denies any drainage, but says she feels like her gums are swollen. Denies any facial pain, but sometimes it hurts up in her jaw. Denies any vision changes, or headaches. Is still able to eat and drink. No throat pain. Denies any other concerns today including chest pain or shortness of breath. No recent fevers. Arrives emergency department hypertensive to 171/96, says she hates being in the ER, and her blood pressure is usually normal at her doctor's office. There are no other complaints, modifying factors or associated symptoms. Nursing notes reviewed. Past medical history:  has a past medical history of Anxiety, Bile leak (2014), GERD (gastroesophageal reflux disease), Hypoxemia (2014), Leg pain, bilateral, Migraine, Overweight, and Pleural effusion (2014). Past surgical history:  has a past surgical history that includes  section; Tubal ligation; [place] tooth extraction; other surgical history (2014); and Upper gastrointestinal endoscopy (N/A, 3/3/2023). Home medications:   Prior to Admission medications    Medication Sig Start Date End Date Taking? Authorizing Provider   amoxicillin-clavulanate (AUGMENTIN) 875-125 MG per tablet Take 1 tablet by mouth 2 times daily for 7 days 3/8/23 3/15/23 Yes Lyla Knight,    oxyCODONE (ROXICODONE) 5 MG immediate release tablet Take 1 tablet by mouth every 6 hours as needed for Pain for up to 3 days. Intended supply: 3 days. Take lowest dose possible to manage pain Max Daily Amount: 20 mg 3/8/23 3/11/23 Yes Lyla Knight DO   naproxen (NAPROSYN) 500 MG tablet Take 1 tablet by mouth 2 times daily as needed for Pain 3/8/23 3/23/23 Yes Lyla Knight DO   lidocaine viscous hcl (XYLOCAINE) 2 % SOLN solution Take 15 mLs by mouth as needed for Irritation 3/8/23 3/13/23 Yes Lyla Knight DO   folic acid (V-R FOLIC ACID) 882 MCG tablet Take 1 tablet by mouth daily 2/9/23   Cas Cui MD   ferrous sulfate (IRON 325) 325 (65 Fe) MG tablet Take 1 tablet by mouth 2 times daily (with meals) 2/9/23   Cas Cui MD   Cholecalciferol 50 MCG (2000 UT) TABS Take 1 tablet by mouth daily Take 1 tablet by mouth daily.  Start this medication after you finish the weekly regimen  Patient not taking: Reported on 3/3/2023 2/9/23   Cas Cui MD   vitamin D (CHOLECALCIFEROL) 37297 UNIT CAPS Take 1 capsule by mouth once a week 2/9/23 5/10/23  Cas Cui MD   Multiple Vitamins-Minerals (THERAPEUTIC MULTIVITAMIN-MINERALS) tablet Take 1 tablet by mouth daily    Historical Provider, MD   Cholecalciferol (VITAMIN D3) 50 MCG (2000 UT) CAPS Take by mouth  Patient not taking: Reported on 3/3/2023    Historical Provider, MD   Buprenorphine HCl (SUBUTEX SL) Place under the tongue    Historical Provider, MD   diphenhydrAMINE HCl, TOPICAL, (BENADRYL ITCH STOPPING) 2 % GEL Apply 10 mLs topically in the morning, at noon, and at bedtime  Patient not taking: Reported on 3/3/2023 5/13/22   Karen Hightower MD   EPINEPHrine (EPIPEN 2-LARRY) 0.3 MG/0.3ML SOAJ injection Inject 0.3 mLs into the muscle once for 1 dose Use as directed for allergic reaction 5/13/22 1/17/23  Karen Hightower MD   nicotine (NICODERM CQ) 21 MG/24HR  5/9/22   Historical Provider, MD   buPROPion (WELLBUTRIN XL) 150 MG extended release tablet  5/9/22   Historical Provider, MD   famotidine (PEPCID) 20 MG tablet Take 1 tablet by mouth 2 times daily for 7 days 5/10/22 1/17/23  Val Durham MD       Allergies   Allergen Reactions    Toradol [Ketorolac Tromethamine] Other (See Comments)     \"restless legs\"       Social history:  reports that she has been smoking cigarettes. She has a 28.50 pack-year smoking history. She has never used smokeless tobacco. She reports that she does not currently use alcohol. She reports that she does not use drugs. Family history:  History reviewed. No pertinent family history. REVIEW OF SYSTEMS  6 systems reviewed, pertinent positives per HPI otherwise noted to be negative    Review of Systems   Constitutional:  Negative for activity change, appetite change, fatigue and fever. HENT:  Positive for dental problem. Negative for congestion and rhinorrhea. Respiratory:  Negative for cough, chest tightness and shortness of breath. Cardiovascular:  Negative for chest pain and leg swelling. Gastrointestinal:  Negative for abdominal pain, diarrhea and vomiting. Musculoskeletal:  Negative for back pain and neck pain. Skin:  Negative for rash and wound. PHYSICAL EXAM  Vitals:    03/08/23 2211   BP: (!) 171/96   Pulse: 66   Resp: 18   Temp: 97.7 °F (36.5 °C)   SpO2: 97%       Physical Exam  Vitals reviewed. Constitutional:       General: She is not in acute distress. Appearance: Normal appearance. She is not ill-appearing. HENT:      Head: Normocephalic and atraumatic. Right Ear: External ear normal.      Left Ear: External ear normal.      Nose: Nose normal. No congestion. Mouth/Throat:      Mouth: Mucous membranes are moist.      Pharynx: Oropharynx is clear. Posterior oropharyngeal erythema present. Comments: Lateral gingiva with tenderness to palpation, fullness, no fluctuance, but some induration. No obvious drainage. About tooth #13 to 14th. Significantly abnormal dentition. Multiple dental caries, fillings. Eyes:      General:         Right eye: No discharge. Left eye: No discharge. Conjunctiva/sclera: Conjunctivae normal.   Cardiovascular:      Rate and Rhythm: Normal rate and regular rhythm. Pulmonary:      Effort: Pulmonary effort is normal. No respiratory distress. Breath sounds: Normal breath sounds. Musculoskeletal:         General: No swelling or tenderness. Cervical back: Normal range of motion and neck supple. Skin:     General: Skin is warm and dry. Neurological:      General: No focal deficit present. Mental Status: She is alert and oriented to person, place, and time. ED COURSE/MDM  Nursing notes reviewed. Pt was given the following medications or treatments in the ED:   Medications   lidocaine viscous hcl (XYLOCAINE) 2 % solution 15 mL (has no administration in time range)   oxyCODONE (ROXICODONE) immediate release tablet 5 mg (has no administration in time range)   naproxen (NAPROSYN) tablet 500 mg (has no administration in time range)   amoxicillin-clavulanate (AUGMENTIN) 875-125 MG per tablet 1 tablet (has no administration in time range)       Patient presented emergency department today with concerns of dental pain after EGD last week, but also has a history of poor dentition and has an upcoming dental appointment for possible extraction. There is some induration to the gingival line about tooth #13 or 14. Initial management includes multimodal pain control as above, and also initiation antibiotics for what I believed to be the start of a dental infection versus abscess. No significant drainage here to evacuate, but will start on outpatient biotics. Encouraged her to call her dentist to try to move up her appointment. Discharged home in stable condition. No signs of facial cellulitis, abscess, or airway compromise. Otherwise vital signs are stable.   She does have hypertension here in the emergency department, says she does not like to be in the ER, and will check her blood pressure outpatient when she gets home over the next couple days. Is not currently on any medication, and I do not believe it is required today here in the ER, asymptomatic hypertension      Clinical Impression  Based on the presenting complaint, history, and physical exam, multiple diagnoses were considered. Exam and workup here most c/w:      1. Pain, dental    2. Dental infection          I discussed with Barber Bishop the results of evaluation in the ED, diagnosis, care, and prognosis. The plan is to discharge to home. Patient is in agreement with plan and questions have been answered. Patient was instructed to return to the ED should they experience any concerning new or worsening symptoms. Instructed patient to follow up with their PCP in 1-3 days or as soon as possible for follow up. Patient understands and agrees with the discharge plan, and I have answered all their questions at this time. Patient is stable for discharge home from the ED at this time. Patient will be started on the following medications from the ED:  New Prescriptions    AMOXICILLIN-CLAVULANATE (AUGMENTIN) 875-125 MG PER TABLET    Take 1 tablet by mouth 2 times daily for 7 days    LIDOCAINE VISCOUS HCL (XYLOCAINE) 2 % SOLN SOLUTION    Take 15 mLs by mouth as needed for Irritation    NAPROXEN (NAPROSYN) 500 MG TABLET    Take 1 tablet by mouth 2 times daily as needed for Pain    OXYCODONE (ROXICODONE) 5 MG IMMEDIATE RELEASE TABLET    Take 1 tablet by mouth every 6 hours as needed for Pain for up to 3 days. Intended supply: 3 days. Take lowest dose possible to manage pain Max Daily Amount: 20 mg         Disposition  Pt is discharged in stable condition.     Disposition Vitals:  BP (!) 171/96   Pulse 66   Temp 97.7 °F (36.5 °C) (Oral)   Resp 18   LMP 02/12/2023   SpO2 97%       YANET ABBOTT, DO        [address] [facility], DO  03/11/23 4619

## 2023-03-11 ASSESSMENT — ENCOUNTER SYMPTOMS
COUGH: 0
RHINORRHEA: 0
SHORTNESS OF BREATH: 0
CHEST TIGHTNESS: 0
ABDOMINAL PAIN: 0
VOMITING: 0
DIARRHEA: 0
BACK PAIN: 0

## 2023-03-15 RX ORDER — CLARITHROMYCIN 500 MG/1
500 TABLET, COATED ORAL 2 TIMES DAILY
Qty: 28 TABLET | Refills: 0 | Status: SHIPPED | OUTPATIENT
Start: 2023-03-15 | End: 2023-03-29

## 2023-03-15 RX ORDER — AMOXICILLIN 500 MG/1
1000 CAPSULE ORAL 2 TIMES DAILY
Qty: 56 CAPSULE | Refills: 0 | Status: SHIPPED | OUTPATIENT
Start: 2023-03-15 | End: 2023-03-29

## 2023-03-23 ENCOUNTER — OFFICE VISIT (OUTPATIENT)
Dept: BARIATRICS/WEIGHT MGMT | Age: 43
End: 2023-03-23
Payer: COMMERCIAL

## 2023-03-23 VITALS
BODY MASS INDEX: 45.41 KG/M2 | WEIGHT: 266 LBS | HEART RATE: 89 BPM | DIASTOLIC BLOOD PRESSURE: 87 MMHG | OXYGEN SATURATION: 98 % | RESPIRATION RATE: 18 BRPM | SYSTOLIC BLOOD PRESSURE: 129 MMHG | HEIGHT: 64 IN

## 2023-03-23 DIAGNOSIS — E66.01 MORBID OBESITY WITH BMI OF 45.0-49.9, ADULT (HCC): ICD-10-CM

## 2023-03-23 DIAGNOSIS — K21.9 CHRONIC GERD: ICD-10-CM

## 2023-03-23 DIAGNOSIS — E78.5 DYSLIPIDEMIA (HIGH LDL; LOW HDL): Primary | ICD-10-CM

## 2023-03-23 PROCEDURE — G8484 FLU IMMUNIZE NO ADMIN: HCPCS | Performed by: SURGERY

## 2023-03-23 PROCEDURE — 99214 OFFICE O/P EST MOD 30 MIN: CPT | Performed by: SURGERY

## 2023-03-23 PROCEDURE — G8427 DOCREV CUR MEDS BY ELIG CLIN: HCPCS | Performed by: SURGERY

## 2023-03-23 PROCEDURE — 1036F TOBACCO NON-USER: CPT | Performed by: SURGERY

## 2023-03-23 PROCEDURE — G8417 CALC BMI ABV UP PARAM F/U: HCPCS | Performed by: SURGERY

## 2023-03-23 NOTE — PROGRESS NOTES
Jef Pena wt is stable / unchanged. Pt is proud to say she hasn't smoked in 24 hours. Discussed filling time with walks and/or using sf gum as needed to cope with not smoking. Is pt eating at least 4 times everyday? yes    B- protein shake   S- sargento snack  L- grilled chicken salad w/ limited dressing  S- sargento snack  D- grilled chicken w/ asparagus  S- sometimes cheese & wheat thins    Is pt eating a lean protein source with all meals and snacks? yes    Has pt decreased their portions using the plate method?  yes    Is pt choosing low fat/sugar free options? yes    Is pt drinking at least 64 oz of clear liquids everyday? yes - water / CL    Has pt stopped drinking carbonation, caffeinated, and sugar sweetened beverages? yes    Has pt sampled Unjury and/or Nectar protein? yes - tried & tolerated    Has pt attended a support group?  Completed    Participating in intentional exercise?  no formal exercise, walks a lot at work    Plan/Recommendations:   - Continue plan    Handouts: none    Brittnee Lau RD, LD
complications. Some strategies discussed today include but not limited to : 30/30/30 minutes rule, food diary, avoid fast food and packing/planing ahead, & increasing exercise. Also stressed to the patient importance of taking the multivitamins as instructed, otherwise risk significant complications. The visit today, included any number of the following: Bariatric Preoperative work up/protocols, review of labs, imaging, provider notes, outside hospital records, performing examination/evaluation, counseling patient and/or family, ordering medications/tests, placing referrals and communication with referring physicians, coordination of care; discussing dietary plan/recall with the patient as well with registered dietitian and documentation in the EHR. Of note, the above was done during same day of the actual patient encounter. Alex Macias is here for her fifth presurgical visit. Patient is making excellent progress as far as her dietary routine. Patient also did quit smoking yesterday and congratulated her on that. Patient is aware she needs to be 2 months smoke/nicotine free. We will see the patient next month and hopefully we can go ahead and schedule her for her final laboratory testing as well as schedule her surgery. We discussed how her excess weight affects her overall health and importance of weight loss, healthy diet and active lifestyle to alleviate those co morbid conditions, otherwise risk deterioration. Morbid Obesity: Body mass index is 45.66 kg/m². [x] Continue to make dietary and lifestyle modifications. [x] Plan for Future laparoscopic sleeve gastrectomy. [x] Return for follow-up next month. Chronic GERD:   [x] Continue to make dietary and lifestyle modifications. [x] Continue Omeprazole. [] Continue Famotidine. [] Plan for EGD to evaluate the stomach. Hyperlipidemia:   [x] Continue to make dietary and lifestyle modifications.   [x] Continue to follow up with

## 2023-05-25 ENCOUNTER — HOSPITAL ENCOUNTER (OUTPATIENT)
Age: 43
Discharge: HOME | End: 2023-05-25
Payer: COMMERCIAL

## 2023-05-25 ENCOUNTER — OFFICE VISIT (OUTPATIENT)
Dept: BARIATRICS/WEIGHT MGMT | Age: 43
End: 2023-05-25
Payer: COMMERCIAL

## 2023-05-25 VITALS
HEIGHT: 65 IN | SYSTOLIC BLOOD PRESSURE: 138 MMHG | DIASTOLIC BLOOD PRESSURE: 82 MMHG | RESPIRATION RATE: 18 BRPM | OXYGEN SATURATION: 98 % | WEIGHT: 265 LBS | BODY MASS INDEX: 44.15 KG/M2 | HEART RATE: 100 BPM

## 2023-05-25 DIAGNOSIS — E78.5 DYSLIPIDEMIA (HIGH LDL; LOW HDL): ICD-10-CM

## 2023-05-25 DIAGNOSIS — E66.01 MORBID OBESITY WITH BMI OF 45.0-49.9, ADULT (HCC): ICD-10-CM

## 2023-05-25 DIAGNOSIS — E66.01 MORBID OBESITY WITH BMI OF 45.0-49.9, ADULT (HCC): Primary | ICD-10-CM

## 2023-05-25 DIAGNOSIS — K21.9 CHRONIC GERD: ICD-10-CM

## 2023-05-25 LAB
AMPHETAMINES UR QL SCN>1000 NG/ML: NORMAL
BARBITURATES UR QL SCN>200 NG/ML: NORMAL
BENZODIAZ UR QL SCN>200 NG/ML: NORMAL
CANNABINOIDS UR QL SCN>50 NG/ML: NORMAL
COCAINE UR QL SCN: NORMAL
DRUG SCREEN COMMENT UR-IMP: NORMAL
ETHANOLAMINE SERPL-MCNC: NORMAL MG/DL (ref 0–0.08)
FENTANYL SCREEN, URINE: NORMAL
HCG UR QL: NEGATIVE
METHADONE UR QL SCN>300 NG/ML: NORMAL
OPIATES UR QL SCN>300 NG/ML: NORMAL
OXYCODONE UR QL SCN: NORMAL
PCP UR QL SCN>25 NG/ML: NORMAL
PH UR STRIP: 5 [PH]

## 2023-05-25 PROCEDURE — 84703 CHORIONIC GONADOTROPIN ASSAY: CPT

## 2023-05-25 PROCEDURE — G0480 DRUG TEST DEF 1-7 CLASSES: HCPCS

## 2023-05-25 PROCEDURE — 99214 OFFICE O/P EST MOD 30 MIN: CPT | Performed by: SURGERY

## 2023-05-25 PROCEDURE — 36415 COLL VENOUS BLD VENIPUNCTURE: CPT

## 2023-05-25 PROCEDURE — 80307 DRUG TEST PRSMV CHEM ANLYZR: CPT

## 2023-05-25 PROCEDURE — 1036F TOBACCO NON-USER: CPT | Performed by: SURGERY

## 2023-05-25 PROCEDURE — G8427 DOCREV CUR MEDS BY ELIG CLIN: HCPCS | Performed by: SURGERY

## 2023-05-25 PROCEDURE — G8417 CALC BMI ABV UP PARAM F/U: HCPCS | Performed by: SURGERY

## 2023-05-25 PROCEDURE — 82077 ASSAY SPEC XCP UR&BREATH IA: CPT

## 2023-05-25 NOTE — PROGRESS NOTES
Carlos Alberto Pena lost 1 lbs over past ~2 months. Is pt eating at least 4 times everyday? yes    B- protein shake OR eggs OR plain oatmeal w/ sf syrup  S- sargento snacks  L- salad w/ nuts  S- sargento snack OR string cheese  D- grilled chicken w/ green beans OR asparagus  S- none    Is pt eating a lean protein source with all meals and snacks? yes    Has pt decreased their portions using the plate method?  yes    Is pt choosing low fat/sugar free options? yes - reviewed guidelines    Is pt drinking at least 64 oz of clear liquids everyday? yes - water / CL    Has pt stopped drinking carbonation, caffeinated, and sugar sweetened beverages? yes    Has pt sampled Unjury and/or Nectar protein? yes - tried & tolerated    Has pt attended a support group?  Completed    Participating in intentional exercise? yes - walking some, ~20 min most days outside of work in addition to walking a lot a work    Plan/Recommendations:   - Continue plan  - Limit cheese to 1-2x/day  - Add fruit 1x/day    Handouts: none    Arleen Conway, RD, LD
overall given compliance. Obesity as a disease is considered a high risk to patients overall health and should therefore be considered a high risk disease state. Now with Covid-19 pandemic, CDC and health authorities does classify obese patients as vulnerable and high risk as well. Which makes weight loss a priority for improvement of their wellbeing and overall health. I advised the patient that we can't guarantee final insurance approval.        I advised the patient that sometimes other indicated procedures may arise and the decision will be based on my assessment intraoperatively. Some may include include but not limited to:  [Ventral Hernia, Risks include but not limited to; infection, bleeding, injury to organs or nerves or vessels, PE, DVT, cardiac events, , persistent pain or symptoms, abscess, hernia recurrence or need for further procedures. However that will be determined intra-operatively, if not safe to proceed , then any additional procedures will have to be addressed later as primary goal is bariatric surgery.]      [ Hiatal Hernia, will attempt repair,  risks and benefits including but not limited to; hemothorax, pneumothorax, recurrence, difficulty swallowing, persistent symptoms, reflux and need for medications, esophageal, splenic, lung, heart, bowel, vagus nerve or gastric injuries. However that will be determined intra-operatively, if not safe to proceed, then any additional procedures will have to be addressed later as primary goal is bariatric surgery.]     Kary Jeronimo understands that there may be a need to perform other urgent or necessary procedures that were unanticipated.  Marce Hardin consent to the performance of any additional procedures determined during the original procedure to be in their best interests and where delay might cause additional harm or put patient at risk for additional anesthesia risk for required by a second procedure and that will be determined by the

## 2023-05-25 NOTE — PATIENT INSTRUCTIONS
Patient received dietary handouts and education.     Plan/Recommendations:   - Continue plan  - Limit cheese to 1-2x/day  - Add fruit 1x/day

## 2023-05-28 LAB
COTININE SERPL-MCNC: <5 NG/ML
NICOTINE SERPL-MCNC: <5 NG/ML

## 2023-05-29 ASSESSMENT — ENCOUNTER SYMPTOMS
EYES NEGATIVE: 1
GASTROINTESTINAL NEGATIVE: 1
SHORTNESS OF BREATH: 0
ALLERGIC/IMMUNOLOGIC NEGATIVE: 1
COUGH: 0
RESPIRATORY NEGATIVE: 1

## 2023-05-29 NOTE — PROGRESS NOTES
reviewed. Constitutional:       Appearance: Normal appearance. She is well-developed. She is obese. HENT:      Head: Normocephalic and atraumatic. Eyes:      Conjunctiva/sclera: Conjunctivae normal.      Pupils: Pupils are equal, round, and reactive to light. Pulmonary:      Effort: Pulmonary effort is normal.   Abdominal:      Palpations: Abdomen is soft. Musculoskeletal:         General: Normal range of motion. Cervical back: Normal range of motion and neck supple. Skin:     General: Skin is warm and dry. Neurological:      Mental Status: She is alert and oriented to person, place, and time. Psychiatric:         Mood and Affect: Mood normal.         Behavior: Behavior normal.         Thought Content: Thought content normal.         Judgment: Judgment normal.     Assessment and Plan:   Patient is here for their preoperative education group visit for sleeve gastrectomy. The patient is down 2 lbs today. The patient's current Body mass index is 43.77 kg/m². (5/30/23). She is making good dietary and behavior modifications and is considered to be a good surgical candidate. Patient has a diagnosis of hyperlipidemia. Discussed the benefits of weight loss and dietary changes on lipids and cholesterol. Discussed the fact that they will be required to crush or open their medications for the first two weeks after surgery and reviewed those medications that can not be crushed. Patient has a diagnosis of chronic GERD and takes a PPI and H2 Blocker. Discussed the benefits of weight loss and dietary changes on acid reflux. However, they will most likely need to continue their medication short term after surgery as they adjust to the new diet. Discussed the fact that they will be required to crush or open their medications for the first two weeks after surgery and reviewed those medications that can not be crushed. Patient sees pain management for her Subutex SL.  Discussed with patient and she said

## 2023-05-30 ENCOUNTER — OFFICE VISIT (OUTPATIENT)
Dept: BARIATRICS/WEIGHT MGMT | Age: 43
End: 2023-05-30
Payer: COMMERCIAL

## 2023-05-30 VITALS — BODY MASS INDEX: 43.82 KG/M2 | WEIGHT: 263 LBS | HEIGHT: 65 IN

## 2023-05-30 DIAGNOSIS — E78.5 DYSLIPIDEMIA (HIGH LDL; LOW HDL): ICD-10-CM

## 2023-05-30 DIAGNOSIS — K21.9 CHRONIC GERD: Primary | ICD-10-CM

## 2023-05-30 DIAGNOSIS — E66.01 MORBID OBESITY WITH BMI OF 40.0-44.9, ADULT (HCC): ICD-10-CM

## 2023-05-30 PROCEDURE — G8427 DOCREV CUR MEDS BY ELIG CLIN: HCPCS | Performed by: NURSE PRACTITIONER

## 2023-05-30 PROCEDURE — 1036F TOBACCO NON-USER: CPT | Performed by: NURSE PRACTITIONER

## 2023-05-30 PROCEDURE — G8417 CALC BMI ABV UP PARAM F/U: HCPCS | Performed by: NURSE PRACTITIONER

## 2023-05-30 PROCEDURE — 99214 OFFICE O/P EST MOD 30 MIN: CPT | Performed by: NURSE PRACTITIONER

## 2023-05-30 RX ORDER — ONDANSETRON 4 MG/1
1 TABLET, FILM COATED ORAL 3 TIMES DAILY PRN
COMMUNITY
Start: 2023-01-19

## 2023-05-30 RX ORDER — PANTOPRAZOLE SODIUM 40 MG/1
1 TABLET, DELAYED RELEASE ORAL DAILY
COMMUNITY
Start: 2023-01-19

## 2023-05-30 NOTE — PROGRESS NOTES
Patient Name:   Serafin Ambrose       Type of Surgery:  Sleeve         Date of Surgery:  Monday July 10th      Start Pre-Op Diet On:  Tuesday June 27th      Start Clear Liquids On:  Sunday July 9 th        NPO after midnight the night before your surgery! Take morning medications with a small amount of water.     NOTES:_______________________________________  ______________________________________________

## 2023-06-01 ENCOUNTER — TELEPHONE (OUTPATIENT)
Dept: BARIATRICS/WEIGHT MGMT | Age: 43
End: 2023-06-01

## 2023-07-06 ENCOUNTER — TELEPHONE (OUTPATIENT)
Dept: BARIATRICS/WEIGHT MGMT | Age: 43
End: 2023-07-06

## 2023-07-07 ENCOUNTER — HOSPITAL ENCOUNTER (OUTPATIENT)
Age: 43
Discharge: HOME | End: 2023-07-07
Payer: COMMERCIAL

## 2023-07-07 ENCOUNTER — TELEPHONE (OUTPATIENT)
Dept: BARIATRICS/WEIGHT MGMT | Age: 43
End: 2023-07-07

## 2023-07-07 DIAGNOSIS — E66.01 MORBID OBESITY WITH BMI OF 45.0-49.9, ADULT (HCC): ICD-10-CM

## 2023-07-07 DIAGNOSIS — Z01.818 PREOPERATIVE CLEARANCE: Primary | ICD-10-CM

## 2023-07-07 LAB
ALBUMIN SERPL-MCNC: 4.5 G/DL (ref 3.4–5)
ALBUMIN/GLOB SERPL: 1.4 {RATIO} (ref 1.1–2.2)
ALP SERPL-CCNC: 70 U/L (ref 40–129)
ALT SERPL-CCNC: 21 U/L (ref 10–40)
ANION GAP SERPL CALCULATED.3IONS-SCNC: 15 MMOL/L (ref 3–16)
AST SERPL-CCNC: 31 U/L (ref 15–37)
BILIRUB SERPL-MCNC: 0.3 MG/DL (ref 0–1)
BUN SERPL-MCNC: 16 MG/DL (ref 7–20)
CALCIUM SERPL-MCNC: 9.6 MG/DL (ref 8.3–10.6)
CHLORIDE SERPL-SCNC: 99 MMOL/L (ref 99–110)
CO2 SERPL-SCNC: 23 MMOL/L (ref 21–32)
CREAT SERPL-MCNC: 0.7 MG/DL (ref 0.6–1.1)
DEPRECATED RDW RBC AUTO: 14.7 % (ref 12.4–15.4)
GFR SERPLBLD CREATININE-BSD FMLA CKD-EPI: >60 ML/MIN/{1.73_M2}
GLUCOSE SERPL-MCNC: 102 MG/DL (ref 70–99)
HCT VFR BLD AUTO: 41.2 % (ref 36–48)
HGB BLD-MCNC: 13.7 G/DL (ref 12–16)
MCH RBC QN AUTO: 27.8 PG (ref 26–34)
MCHC RBC AUTO-ENTMCNC: 33.3 G/DL (ref 31–36)
MCV RBC AUTO: 83.6 FL (ref 80–100)
PLATELET # BLD AUTO: 249 K/UL (ref 135–450)
PMV BLD AUTO: 9.4 FL (ref 5–10.5)
POTASSIUM SERPL-SCNC: 3.9 MMOL/L (ref 3.5–5.1)
PROT SERPL-MCNC: 7.7 G/DL (ref 6.4–8.2)
RBC # BLD AUTO: 4.92 M/UL (ref 4–5.2)
SODIUM SERPL-SCNC: 137 MMOL/L (ref 136–145)
WBC # BLD AUTO: 11.8 K/UL (ref 4–11)

## 2023-07-07 PROCEDURE — 36415 COLL VENOUS BLD VENIPUNCTURE: CPT

## 2023-07-07 PROCEDURE — 85027 COMPLETE CBC AUTOMATED: CPT

## 2023-07-07 PROCEDURE — 80053 COMPREHEN METABOLIC PANEL: CPT

## 2023-07-09 ASSESSMENT — ENCOUNTER SYMPTOMS
ALLERGIC/IMMUNOLOGIC NEGATIVE: 1
RESPIRATORY NEGATIVE: 1
GASTROINTESTINAL NEGATIVE: 1
EYES NEGATIVE: 1
ROS SKIN COMMENTS: ABDOMINAL SURGICAL INCISIONS.

## 2023-07-10 ENCOUNTER — ANESTHESIA (OUTPATIENT)
Dept: OPERATING ROOM | Age: 43
DRG: 620 | End: 2023-07-10
Payer: COMMERCIAL

## 2023-07-10 ENCOUNTER — HOSPITAL ENCOUNTER (INPATIENT)
Age: 43
LOS: 1 days | Discharge: HOME | DRG: 620 | End: 2023-07-11
Attending: SURGERY | Admitting: SURGERY
Payer: COMMERCIAL

## 2023-07-10 ENCOUNTER — ANESTHESIA EVENT (OUTPATIENT)
Dept: OPERATING ROOM | Age: 43
DRG: 620 | End: 2023-07-10
Payer: COMMERCIAL

## 2023-07-10 DIAGNOSIS — E66.01 MORBID OBESITY (HCC): ICD-10-CM

## 2023-07-10 DIAGNOSIS — Z98.84 S/P LAPAROSCOPIC SLEEVE GASTRECTOMY: ICD-10-CM

## 2023-07-10 DIAGNOSIS — Z01.818 PREOP TESTING: Primary | ICD-10-CM

## 2023-07-10 PROBLEM — K43.0 INCARCERATED INCISIONAL HERNIA: Status: ACTIVE | Noted: 2023-07-10

## 2023-07-10 LAB
ABO + RH BLD: NORMAL
BLD GP AB SCN SERPL QL: NORMAL
GLUCOSE BLD-MCNC: 103 MG/DL (ref 70–99)
HCG UR QL: NEGATIVE
PERFORMED ON: ABNORMAL

## 2023-07-10 PROCEDURE — 84703 CHORIONIC GONADOTROPIN ASSAY: CPT

## 2023-07-10 PROCEDURE — 43775 LAP SLEEVE GASTRECTOMY: CPT | Performed by: SURGERY

## 2023-07-10 PROCEDURE — 2500000003 HC RX 250 WO HCPCS: Performed by: SURGERY

## 2023-07-10 PROCEDURE — 6370000000 HC RX 637 (ALT 250 FOR IP): Performed by: SURGERY

## 2023-07-10 PROCEDURE — 6360000002 HC RX W HCPCS: Performed by: ANESTHESIOLOGY

## 2023-07-10 PROCEDURE — 6360000002 HC RX W HCPCS: Performed by: SURGERY

## 2023-07-10 PROCEDURE — 2580000003 HC RX 258: Performed by: SURGERY

## 2023-07-10 PROCEDURE — A4217 STERILE WATER/SALINE, 500 ML: HCPCS | Performed by: SURGERY

## 2023-07-10 PROCEDURE — 86901 BLOOD TYPING SEROLOGIC RH(D): CPT

## 2023-07-10 PROCEDURE — 2720000010 HC SURG SUPPLY STERILE: Performed by: SURGERY

## 2023-07-10 PROCEDURE — 6360000002 HC RX W HCPCS: Performed by: NURSE ANESTHETIST, CERTIFIED REGISTERED

## 2023-07-10 PROCEDURE — 3700000001 HC ADD 15 MINUTES (ANESTHESIA): Performed by: SURGERY

## 2023-07-10 PROCEDURE — 3600000005 HC SURGERY LEVEL 5 BASE: Performed by: SURGERY

## 2023-07-10 PROCEDURE — 86850 RBC ANTIBODY SCREEN: CPT

## 2023-07-10 PROCEDURE — 2709999900 HC NON-CHARGEABLE SUPPLY: Performed by: SURGERY

## 2023-07-10 PROCEDURE — 94761 N-INVAS EAR/PLS OXIMETRY MLT: CPT

## 2023-07-10 PROCEDURE — 7100000001 HC PACU RECOVERY - ADDTL 15 MIN: Performed by: SURGERY

## 2023-07-10 PROCEDURE — 7100000000 HC PACU RECOVERY - FIRST 15 MIN: Performed by: SURGERY

## 2023-07-10 PROCEDURE — 88307 TISSUE EXAM BY PATHOLOGIST: CPT

## 2023-07-10 PROCEDURE — 3600000015 HC SURGERY LEVEL 5 ADDTL 15MIN: Performed by: SURGERY

## 2023-07-10 PROCEDURE — 0DB64Z3 EXCISION OF STOMACH, PERCUTANEOUS ENDOSCOPIC APPROACH, VERTICAL: ICD-10-PCS | Performed by: SURGERY

## 2023-07-10 PROCEDURE — 88342 IMHCHEM/IMCYTCHM 1ST ANTB: CPT

## 2023-07-10 PROCEDURE — 1200000000 HC SEMI PRIVATE

## 2023-07-10 PROCEDURE — C9113 INJ PANTOPRAZOLE SODIUM, VIA: HCPCS | Performed by: SURGERY

## 2023-07-10 PROCEDURE — 94150 VITAL CAPACITY TEST: CPT

## 2023-07-10 PROCEDURE — 3700000000 HC ANESTHESIA ATTENDED CARE: Performed by: SURGERY

## 2023-07-10 PROCEDURE — 86900 BLOOD TYPING SEROLOGIC ABO: CPT

## 2023-07-10 PROCEDURE — 2700000000 HC OXYGEN THERAPY PER DAY

## 2023-07-10 PROCEDURE — 2500000003 HC RX 250 WO HCPCS: Performed by: NURSE ANESTHETIST, CERTIFIED REGISTERED

## 2023-07-10 RX ORDER — METOCLOPRAMIDE HYDROCHLORIDE 5 MG/ML
10 INJECTION INTRAMUSCULAR; INTRAVENOUS EVERY 6 HOURS PRN
Status: DISCONTINUED | OUTPATIENT
Start: 2023-07-10 | End: 2023-07-11 | Stop reason: HOSPADM

## 2023-07-10 RX ORDER — PROPOFOL 10 MG/ML
INJECTION, EMULSION INTRAVENOUS PRN
Status: DISCONTINUED | OUTPATIENT
Start: 2023-07-10 | End: 2023-07-10 | Stop reason: SDUPTHER

## 2023-07-10 RX ORDER — ENOXAPARIN SODIUM 100 MG/ML
30 INJECTION SUBCUTANEOUS ONCE
Status: COMPLETED | OUTPATIENT
Start: 2023-07-10 | End: 2023-07-10

## 2023-07-10 RX ORDER — SCOLOPAMINE TRANSDERMAL SYSTEM 1 MG/1
1 PATCH, EXTENDED RELEASE TRANSDERMAL ONCE
Status: DISCONTINUED | OUTPATIENT
Start: 2023-07-10 | End: 2023-07-10

## 2023-07-10 RX ORDER — LABETALOL HYDROCHLORIDE 5 MG/ML
10 INJECTION, SOLUTION INTRAVENOUS
Status: DISCONTINUED | OUTPATIENT
Start: 2023-07-10 | End: 2023-07-11 | Stop reason: HOSPADM

## 2023-07-10 RX ORDER — PROCHLORPERAZINE EDISYLATE 5 MG/ML
INJECTION INTRAMUSCULAR; INTRAVENOUS
Status: COMPLETED
Start: 2023-07-10 | End: 2023-07-10

## 2023-07-10 RX ORDER — PROCHLORPERAZINE EDISYLATE 5 MG/ML
5 INJECTION INTRAMUSCULAR; INTRAVENOUS
Status: DISCONTINUED | OUTPATIENT
Start: 2023-07-10 | End: 2023-07-10 | Stop reason: HOSPADM

## 2023-07-10 RX ORDER — FENTANYL CITRATE 50 UG/ML
INJECTION, SOLUTION INTRAMUSCULAR; INTRAVENOUS PRN
Status: DISCONTINUED | OUTPATIENT
Start: 2023-07-10 | End: 2023-07-10 | Stop reason: SDUPTHER

## 2023-07-10 RX ORDER — MAGNESIUM HYDROXIDE 1200 MG/15ML
LIQUID ORAL CONTINUOUS PRN
Status: DISCONTINUED | OUTPATIENT
Start: 2023-07-10 | End: 2023-07-10 | Stop reason: HOSPADM

## 2023-07-10 RX ORDER — MAGNESIUM HYDROXIDE 1200 MG/15ML
LIQUID ORAL
Status: COMPLETED | OUTPATIENT
Start: 2023-07-10 | End: 2023-07-10

## 2023-07-10 RX ORDER — ENOXAPARIN SODIUM 100 MG/ML
30 INJECTION SUBCUTANEOUS EVERY 12 HOURS SCHEDULED
Status: DISCONTINUED | OUTPATIENT
Start: 2023-07-10 | End: 2023-07-11 | Stop reason: HOSPADM

## 2023-07-10 RX ORDER — KETAMINE HCL IN NACL, ISO-OSM 100MG/10ML
SYRINGE (ML) INJECTION PRN
Status: DISCONTINUED | OUTPATIENT
Start: 2023-07-10 | End: 2023-07-10 | Stop reason: SDUPTHER

## 2023-07-10 RX ORDER — SODIUM CHLORIDE 9 MG/ML
INJECTION, SOLUTION INTRAVENOUS PRN
Status: DISCONTINUED | OUTPATIENT
Start: 2023-07-10 | End: 2023-07-10 | Stop reason: HOSPADM

## 2023-07-10 RX ORDER — BUPIVACAINE HYDROCHLORIDE AND EPINEPHRINE 5; 5 MG/ML; UG/ML
INJECTION, SOLUTION PERINEURAL
Status: COMPLETED | OUTPATIENT
Start: 2023-07-10 | End: 2023-07-10

## 2023-07-10 RX ORDER — SODIUM CHLORIDE 0.9 % (FLUSH) 0.9 %
5-40 SYRINGE (ML) INJECTION PRN
Status: DISCONTINUED | OUTPATIENT
Start: 2023-07-10 | End: 2023-07-11 | Stop reason: HOSPADM

## 2023-07-10 RX ORDER — ROCURONIUM BROMIDE 10 MG/ML
INJECTION, SOLUTION INTRAVENOUS PRN
Status: DISCONTINUED | OUTPATIENT
Start: 2023-07-10 | End: 2023-07-10 | Stop reason: SDUPTHER

## 2023-07-10 RX ORDER — NALOXONE HYDROCHLORIDE 0.4 MG/ML
INJECTION, SOLUTION INTRAMUSCULAR; INTRAVENOUS; SUBCUTANEOUS PRN
Status: DISCONTINUED | OUTPATIENT
Start: 2023-07-10 | End: 2023-07-11

## 2023-07-10 RX ORDER — LIDOCAINE 4 G/G
1 PATCH TOPICAL DAILY
Status: DISCONTINUED | OUTPATIENT
Start: 2023-07-10 | End: 2023-07-11 | Stop reason: HOSPADM

## 2023-07-10 RX ORDER — SODIUM CHLORIDE 0.9 % (FLUSH) 0.9 %
5-40 SYRINGE (ML) INJECTION EVERY 12 HOURS SCHEDULED
Status: DISCONTINUED | OUTPATIENT
Start: 2023-07-10 | End: 2023-07-10 | Stop reason: HOSPADM

## 2023-07-10 RX ORDER — ONDANSETRON 2 MG/ML
INJECTION INTRAMUSCULAR; INTRAVENOUS PRN
Status: DISCONTINUED | OUTPATIENT
Start: 2023-07-10 | End: 2023-07-10 | Stop reason: SDUPTHER

## 2023-07-10 RX ORDER — SODIUM CHLORIDE 9 MG/ML
INJECTION, SOLUTION INTRAVENOUS PRN
Status: DISCONTINUED | OUTPATIENT
Start: 2023-07-10 | End: 2023-07-11 | Stop reason: HOSPADM

## 2023-07-10 RX ORDER — PROCHLORPERAZINE EDISYLATE 5 MG/ML
5 INJECTION INTRAMUSCULAR; INTRAVENOUS
Status: COMPLETED | OUTPATIENT
Start: 2023-07-10 | End: 2023-07-10

## 2023-07-10 RX ORDER — FAMOTIDINE 10 MG/ML
INJECTION, SOLUTION INTRAVENOUS PRN
Status: DISCONTINUED | OUTPATIENT
Start: 2023-07-10 | End: 2023-07-10 | Stop reason: SDUPTHER

## 2023-07-10 RX ORDER — PROMETHAZINE HYDROCHLORIDE 25 MG/ML
6.25 INJECTION, SOLUTION INTRAMUSCULAR; INTRAVENOUS EVERY 6 HOURS PRN
Status: DISCONTINUED | OUTPATIENT
Start: 2023-07-10 | End: 2023-07-11 | Stop reason: HOSPADM

## 2023-07-10 RX ORDER — DIPHENHYDRAMINE HYDROCHLORIDE 50 MG/ML
12.5 INJECTION INTRAMUSCULAR; INTRAVENOUS EVERY 6 HOURS PRN
Status: DISCONTINUED | OUTPATIENT
Start: 2023-07-10 | End: 2023-07-11 | Stop reason: HOSPADM

## 2023-07-10 RX ORDER — BUPIVACAINE HYDROCHLORIDE AND EPINEPHRINE 2.5; 5 MG/ML; UG/ML
INJECTION, SOLUTION EPIDURAL; INFILTRATION; INTRACAUDAL; PERINEURAL
Status: COMPLETED | OUTPATIENT
Start: 2023-07-10 | End: 2023-07-10

## 2023-07-10 RX ORDER — HYDRALAZINE HYDROCHLORIDE 20 MG/ML
10 INJECTION INTRAMUSCULAR; INTRAVENOUS
Status: DISCONTINUED | OUTPATIENT
Start: 2023-07-10 | End: 2023-07-11 | Stop reason: HOSPADM

## 2023-07-10 RX ORDER — DEXMEDETOMIDINE HYDROCHLORIDE 100 UG/ML
INJECTION, SOLUTION INTRAVENOUS PRN
Status: DISCONTINUED | OUTPATIENT
Start: 2023-07-10 | End: 2023-07-10 | Stop reason: SDUPTHER

## 2023-07-10 RX ORDER — HYDROMORPHONE HYDROCHLORIDE 1 MG/ML
0.5 INJECTION, SOLUTION INTRAMUSCULAR; INTRAVENOUS; SUBCUTANEOUS
Status: DISCONTINUED | OUTPATIENT
Start: 2023-07-10 | End: 2023-07-11 | Stop reason: HOSPADM

## 2023-07-10 RX ORDER — SCOLOPAMINE TRANSDERMAL SYSTEM 1 MG/1
1 PATCH, EXTENDED RELEASE TRANSDERMAL
Status: DISCONTINUED | OUTPATIENT
Start: 2023-07-13 | End: 2023-07-11 | Stop reason: HOSPADM

## 2023-07-10 RX ORDER — ONDANSETRON 2 MG/ML
4 INJECTION INTRAMUSCULAR; INTRAVENOUS
Status: DISCONTINUED | OUTPATIENT
Start: 2023-07-10 | End: 2023-07-10 | Stop reason: HOSPADM

## 2023-07-10 RX ORDER — ACETAMINOPHEN 160 MG/5ML
650 SOLUTION ORAL ONCE
Status: COMPLETED | OUTPATIENT
Start: 2023-07-10 | End: 2023-07-10

## 2023-07-10 RX ORDER — SODIUM CHLORIDE 0.9 % (FLUSH) 0.9 %
5-40 SYRINGE (ML) INJECTION PRN
Status: DISCONTINUED | OUTPATIENT
Start: 2023-07-10 | End: 2023-07-10 | Stop reason: HOSPADM

## 2023-07-10 RX ORDER — MIDAZOLAM HYDROCHLORIDE 1 MG/ML
INJECTION INTRAMUSCULAR; INTRAVENOUS PRN
Status: DISCONTINUED | OUTPATIENT
Start: 2023-07-10 | End: 2023-07-10 | Stop reason: SDUPTHER

## 2023-07-10 RX ORDER — MAGNESIUM SULFATE HEPTAHYDRATE 500 MG/ML
INJECTION, SOLUTION INTRAMUSCULAR; INTRAVENOUS PRN
Status: DISCONTINUED | OUTPATIENT
Start: 2023-07-10 | End: 2023-07-10 | Stop reason: SDUPTHER

## 2023-07-10 RX ORDER — MEPERIDINE HYDROCHLORIDE 25 MG/ML
12.5 INJECTION INTRAMUSCULAR; INTRAVENOUS; SUBCUTANEOUS EVERY 5 MIN PRN
Status: DISCONTINUED | OUTPATIENT
Start: 2023-07-10 | End: 2023-07-10 | Stop reason: HOSPADM

## 2023-07-10 RX ORDER — HYDROMORPHONE HCL 110MG/55ML
0.5 PATIENT CONTROLLED ANALGESIA SYRINGE INTRAVENOUS EVERY 5 MIN PRN
Status: DISCONTINUED | OUTPATIENT
Start: 2023-07-10 | End: 2023-07-10 | Stop reason: HOSPADM

## 2023-07-10 RX ORDER — SODIUM CHLORIDE, SODIUM LACTATE, POTASSIUM CHLORIDE, CALCIUM CHLORIDE 600; 310; 30; 20 MG/100ML; MG/100ML; MG/100ML; MG/100ML
INJECTION, SOLUTION INTRAVENOUS CONTINUOUS
Status: DISCONTINUED | OUTPATIENT
Start: 2023-07-10 | End: 2023-07-11 | Stop reason: HOSPADM

## 2023-07-10 RX ORDER — DEXAMETHASONE SODIUM PHOSPHATE 4 MG/ML
INJECTION, SOLUTION INTRA-ARTICULAR; INTRALESIONAL; INTRAMUSCULAR; INTRAVENOUS; SOFT TISSUE PRN
Status: DISCONTINUED | OUTPATIENT
Start: 2023-07-10 | End: 2023-07-10 | Stop reason: SDUPTHER

## 2023-07-10 RX ORDER — ONDANSETRON 2 MG/ML
4 INJECTION INTRAMUSCULAR; INTRAVENOUS EVERY 6 HOURS PRN
Status: DISCONTINUED | OUTPATIENT
Start: 2023-07-10 | End: 2023-07-11 | Stop reason: HOSPADM

## 2023-07-10 RX ORDER — METHYLENE BLUE 10 MG/ML
INJECTION INTRAVENOUS
Status: COMPLETED | OUTPATIENT
Start: 2023-07-10 | End: 2023-07-10

## 2023-07-10 RX ORDER — SODIUM CHLORIDE, SODIUM LACTATE, POTASSIUM CHLORIDE, CALCIUM CHLORIDE 600; 310; 30; 20 MG/100ML; MG/100ML; MG/100ML; MG/100ML
INJECTION, SOLUTION INTRAVENOUS CONTINUOUS
Status: DISCONTINUED | OUTPATIENT
Start: 2023-07-10 | End: 2023-07-10 | Stop reason: HOSPADM

## 2023-07-10 RX ORDER — SODIUM CHLORIDE 0.9 % (FLUSH) 0.9 %
5-40 SYRINGE (ML) INJECTION EVERY 12 HOURS SCHEDULED
Status: DISCONTINUED | OUTPATIENT
Start: 2023-07-10 | End: 2023-07-11 | Stop reason: HOSPADM

## 2023-07-10 RX ORDER — HYOSCYAMINE SULFATE 0.5 MG/ML
250 INJECTION, SOLUTION SUBCUTANEOUS EVERY 4 HOURS PRN
Status: DISCONTINUED | OUTPATIENT
Start: 2023-07-10 | End: 2023-07-11 | Stop reason: HOSPADM

## 2023-07-10 RX ORDER — GLYCOPYRROLATE 0.2 MG/ML
INJECTION INTRAMUSCULAR; INTRAVENOUS PRN
Status: DISCONTINUED | OUTPATIENT
Start: 2023-07-10 | End: 2023-07-10 | Stop reason: SDUPTHER

## 2023-07-10 RX ORDER — LIDOCAINE HYDROCHLORIDE 20 MG/ML
INJECTION, SOLUTION INFILTRATION; PERINEURAL PRN
Status: DISCONTINUED | OUTPATIENT
Start: 2023-07-10 | End: 2023-07-10 | Stop reason: SDUPTHER

## 2023-07-10 RX ORDER — LIDOCAINE HYDROCHLORIDE 10 MG/ML
0.5 INJECTION, SOLUTION EPIDURAL; INFILTRATION; INTRACAUDAL; PERINEURAL ONCE
Status: DISCONTINUED | OUTPATIENT
Start: 2023-07-10 | End: 2023-07-10 | Stop reason: HOSPADM

## 2023-07-10 RX ADMIN — HYDROMORPHONE HYDROCHLORIDE 0.5 MG: 2 INJECTION, SOLUTION INTRAMUSCULAR; INTRAVENOUS; SUBCUTANEOUS at 10:16

## 2023-07-10 RX ADMIN — ENOXAPARIN SODIUM 30 MG: 100 INJECTION SUBCUTANEOUS at 07:03

## 2023-07-10 RX ADMIN — GLYCOPYRROLATE 0.2 MG: 0.2 INJECTION, SOLUTION INTRAMUSCULAR; INTRAVENOUS at 08:26

## 2023-07-10 RX ADMIN — FENTANYL CITRATE 50 MCG: 50 INJECTION, SOLUTION INTRAMUSCULAR; INTRAVENOUS at 08:42

## 2023-07-10 RX ADMIN — PHENYLEPHRINE HYDROCHLORIDE 100 MCG: 10 INJECTION INTRAVENOUS at 08:33

## 2023-07-10 RX ADMIN — ONDANSETRON 4 MG: 2 INJECTION INTRAMUSCULAR; INTRAVENOUS at 09:56

## 2023-07-10 RX ADMIN — ENOXAPARIN SODIUM 30 MG: 100 INJECTION SUBCUTANEOUS at 22:32

## 2023-07-10 RX ADMIN — MIDAZOLAM 2 MG: 1 INJECTION INTRAMUSCULAR; INTRAVENOUS at 08:22

## 2023-07-10 RX ADMIN — PHENYLEPHRINE HYDROCHLORIDE 100 MCG: 10 INJECTION INTRAVENOUS at 09:13

## 2023-07-10 RX ADMIN — PROCHLORPERAZINE EDISYLATE 5 MG: 5 INJECTION INTRAMUSCULAR; INTRAVENOUS at 11:03

## 2023-07-10 RX ADMIN — PROPOFOL 200 MG: 10 INJECTION, EMULSION INTRAVENOUS at 08:28

## 2023-07-10 RX ADMIN — HYDROMORPHONE HYDROCHLORIDE 0.5 MG: 2 INJECTION, SOLUTION INTRAMUSCULAR; INTRAVENOUS; SUBCUTANEOUS at 10:26

## 2023-07-10 RX ADMIN — SODIUM CHLORIDE, PRESERVATIVE FREE 10 ML: 5 INJECTION INTRAVENOUS at 12:23

## 2023-07-10 RX ADMIN — ACETAMINOPHEN 650 MG: 650 SOLUTION ORAL at 07:03

## 2023-07-10 RX ADMIN — Medication: at 10:30

## 2023-07-10 RX ADMIN — FENTANYL CITRATE 50 MCG: 50 INJECTION, SOLUTION INTRAMUSCULAR; INTRAVENOUS at 08:28

## 2023-07-10 RX ADMIN — DEXMEDETOMIDINE HYDROCHLORIDE 10 MCG: 100 INJECTION, SOLUTION INTRAVENOUS at 09:05

## 2023-07-10 RX ADMIN — DEXMEDETOMIDINE HYDROCHLORIDE 20 MCG: 100 INJECTION, SOLUTION INTRAVENOUS at 09:52

## 2023-07-10 RX ADMIN — ONDANSETRON 4 MG: 2 INJECTION INTRAMUSCULAR; INTRAVENOUS at 22:24

## 2023-07-10 RX ADMIN — SODIUM CHLORIDE, POTASSIUM CHLORIDE, SODIUM LACTATE AND CALCIUM CHLORIDE: 600; 310; 30; 20 INJECTION, SOLUTION INTRAVENOUS at 07:04

## 2023-07-10 RX ADMIN — FENTANYL CITRATE 100 MCG: 50 INJECTION, SOLUTION INTRAMUSCULAR; INTRAVENOUS at 09:58

## 2023-07-10 RX ADMIN — SODIUM CHLORIDE, POTASSIUM CHLORIDE, SODIUM LACTATE AND CALCIUM CHLORIDE: 600; 310; 30; 20 INJECTION, SOLUTION INTRAVENOUS at 15:07

## 2023-07-10 RX ADMIN — Medication 3000 MG: at 08:21

## 2023-07-10 RX ADMIN — DEXAMETHASONE SODIUM PHOSPHATE 4 MG: 4 INJECTION, SOLUTION INTRAMUSCULAR; INTRAVENOUS at 08:34

## 2023-07-10 RX ADMIN — Medication 25 MG: at 08:34

## 2023-07-10 RX ADMIN — MAGNESIUM SULFATE HEPTAHYDRATE 1 G: 500 INJECTION, SOLUTION INTRAMUSCULAR; INTRAVENOUS at 08:26

## 2023-07-10 RX ADMIN — SODIUM CHLORIDE, PRESERVATIVE FREE 40 MG: 5 INJECTION INTRAVENOUS at 12:23

## 2023-07-10 RX ADMIN — HYDROMORPHONE HYDROCHLORIDE 0.5 MG: 2 INJECTION, SOLUTION INTRAMUSCULAR; INTRAVENOUS; SUBCUTANEOUS at 11:19

## 2023-07-10 RX ADMIN — DEXMEDETOMIDINE HYDROCHLORIDE 10 MCG: 100 INJECTION, SOLUTION INTRAVENOUS at 08:55

## 2023-07-10 RX ADMIN — Medication 25 MG: at 08:44

## 2023-07-10 RX ADMIN — PHENYLEPHRINE HYDROCHLORIDE 100 MCG: 10 INJECTION INTRAVENOUS at 09:08

## 2023-07-10 RX ADMIN — ROCURONIUM BROMIDE 50 MG: 10 INJECTION, SOLUTION INTRAVENOUS at 08:28

## 2023-07-10 RX ADMIN — ONDANSETRON 4 MG: 2 INJECTION INTRAMUSCULAR; INTRAVENOUS at 08:37

## 2023-07-10 RX ADMIN — LIDOCAINE HYDROCHLORIDE 100 MG: 20 INJECTION, SOLUTION INFILTRATION; PERINEURAL at 08:28

## 2023-07-10 RX ADMIN — SUGAMMADEX 200 MG: 100 INJECTION, SOLUTION INTRAVENOUS at 09:38

## 2023-07-10 RX ADMIN — SODIUM CHLORIDE, POTASSIUM CHLORIDE, SODIUM LACTATE AND CALCIUM CHLORIDE: 600; 310; 30; 20 INJECTION, SOLUTION INTRAVENOUS at 09:10

## 2023-07-10 RX ADMIN — FAMOTIDINE 20 MG: 10 INJECTION INTRAVENOUS at 08:17

## 2023-07-10 RX ADMIN — PHENYLEPHRINE HYDROCHLORIDE 100 MCG: 10 INJECTION INTRAVENOUS at 09:20

## 2023-07-10 ASSESSMENT — PAIN DESCRIPTION - LOCATION
LOCATION: ABDOMEN

## 2023-07-10 ASSESSMENT — PAIN SCALES - GENERAL
PAINLEVEL_OUTOF10: 9
PAINLEVEL_OUTOF10: 7
PAINLEVEL_OUTOF10: 9
PAINLEVEL_OUTOF10: 9
PAINLEVEL_OUTOF10: 0
PAINLEVEL_OUTOF10: 8

## 2023-07-10 ASSESSMENT — LIFESTYLE VARIABLES: SMOKING_STATUS: 1

## 2023-07-10 ASSESSMENT — PAIN DESCRIPTION - DESCRIPTORS
DESCRIPTORS: SHARP
DESCRIPTORS: STABBING
DESCRIPTORS: BURNING
DESCRIPTORS: ACHING

## 2023-07-10 ASSESSMENT — PAIN DESCRIPTION - ORIENTATION
ORIENTATION: MID

## 2023-07-10 ASSESSMENT — PAIN - FUNCTIONAL ASSESSMENT: PAIN_FUNCTIONAL_ASSESSMENT: 0-10

## 2023-07-10 NOTE — PROGRESS NOTES
Pt admitted to PACU, awakens to verbal commands, abd incisions CD&Ix5 - ice pack placed. Vss, O2 saturations stable on 6L simple mask.

## 2023-07-10 NOTE — ANESTHESIA PRE PROCEDURE
Counseling given: Not Answered      Vital Signs (Current): There were no vitals filed for this visit.                                            BP Readings from Last 3 Encounters:   07/10/23 (!) 165/79   05/25/23 138/82   03/23/23 129/87       NPO Status:                                                                                 BMI:   Wt Readings from Last 3 Encounters:   07/10/23 256 lb (116.1 kg)   05/30/23 263 lb (119.3 kg)   05/25/23 265 lb (120.2 kg)     There is no height or weight on file to calculate BMI.    CBC:   Lab Results   Component Value Date/Time    WBC 11.8 07/07/2023 08:50 AM    RBC 4.92 07/07/2023 08:50 AM    HGB 13.7 07/07/2023 08:50 AM    HCT 41.2 07/07/2023 08:50 AM    MCV 83.6 07/07/2023 08:50 AM    RDW 14.7 07/07/2023 08:50 AM     07/07/2023 08:50 AM       CMP:   Lab Results   Component Value Date/Time     07/07/2023 08:50 AM    K 3.9 07/07/2023 08:50 AM    CL 99 07/07/2023 08:50 AM    CO2 23 07/07/2023 08:50 AM    BUN 16 07/07/2023 08:50 AM    CREATININE 0.7 07/07/2023 08:50 AM    GFRAA >60 04/04/2017 10:58 AM    GFRAA >60 06/29/2012 06:18 PM    AGRATIO 1.4 07/07/2023 08:50 AM    LABGLOM >60 07/07/2023 08:50 AM    GLUCOSE 102 07/07/2023 08:50 AM    PROT 7.7 07/07/2023 08:50 AM    PROT 7.8 06/29/2012 06:18 PM    CALCIUM 9.6 07/07/2023 08:50 AM    BILITOT 0.3 07/07/2023 08:50 AM    ALKPHOS 70 07/07/2023 08:50 AM    AST 31 07/07/2023 08:50 AM    ALT 21 07/07/2023 08:50 AM       POC Tests:   Recent Labs     07/10/23  0659   POCGLU 103*       Coags:   Lab Results   Component Value Date/Time    PROTIME 12.9 01/19/2023 02:24 PM    INR 0.99 01/19/2023 02:24 PM       HCG (If Applicable):   Lab Results   Component Value Date    PREGTESTUR Negative 07/10/2023        ABGs: No results found for: PHART, PO2ART, XTX8PJE, KOB4FKW, BEART, Q1GITMTA     Type & Screen (If Applicable):  No results found for: LABABO, LABRH    Drug/Infectious Status (If Applicable):  No results

## 2023-07-10 NOTE — PROGRESS NOTES
07/10/23 1455   Incentive Spirometry Tx   Treatment Effort Initial;Assisted by RT; Independent; Instructed; Needs encouragement   Predicted Volume 547   Achieved Volume (mL) 400 mL

## 2023-07-10 NOTE — ANESTHESIA POSTPROCEDURE EVALUATION
Department of Anesthesiology  Postprocedure Note    Patient: Claude Underwood  MRN: [id number]  YOB: 1980  Date of evaluation: 7/10/2023      Procedure Summary     Date: 07/10/23 Room / Location: [address]    Anesthesia Start: 3833 Anesthesia Stop: 1004    Procedure: LAPAROSCOPIC SLEEVE GASTRECTOMY (Abdomen) Diagnosis:       Morbid obesity ([address])      (Morbid obesity ([address]) [E66.01])    Surgeons: Edmundo Lao MD Responsible Provider: Levi Jackson MD    Anesthesia Type: general ASA Status: 3          Anesthesia Type: No value filed.     Unique Phase I: Unique Score: 8    Unique Phase II:        Anesthesia Post Evaluation    Patient location during evaluation: PACU  Patient participation: complete - patient participated  Level of consciousness: awake  Airway patency: patent  Nausea & Vomiting: no vomiting and no nausea  Complications: no  Cardiovascular status: hemodynamically stable  Respiratory status: acceptable  Hydration status: stable  Multimodal analgesia pain management approach

## 2023-07-10 NOTE — PROGRESS NOTES
Nursing care provided to prep patient for surgery. Patient lost 7 lbs. on pre-op diet, informed surgeon. Pre-op orders completed. Bilateral foot pneumatic sleeves applied. Teaching / education initiated regarding perioperative experience, post-op expectations and plan of care, and pain management during hospital stay. Patient verbalized understanding. The care plan and education has been reviewed and mutually agreed upon with the patient.

## 2023-07-10 NOTE — OP NOTE
stable. Condition: stable    Attending Attestation: I was present and scrubbed for the entire procedure.       Electronically signed by Carolina Rios MD , FACS, FASMBS  on 7/10/2023 at 9:55 AM

## 2023-07-10 NOTE — PROGRESS NOTES
Pt resting appears comfortable, awakens to verbal commands and c/o pain. Has been given dilaudid 2mg IV and pca in reach. Pt has reached max dose. States \" pain is incisional burning\" ice pack in place. Pt able to drift off to sleep easily. No further nausea. incisions intact, vss, remains on 3L NC.  Phase 1 discharge criteria met

## 2023-07-10 NOTE — PROGRESS NOTES
Patient s/p sleeve gastrectomy. VSS. Abdomen soft, appropriately tender, incision stable with no erythema. Patient ambulating in hallway, with binder in place, every 60-90 minutes. SCD's in place while in bed. I/O being monitored, patient remains NPO. Patient instructed on proper use and frequency of incentive spirometer. Pain controlled with PCA. Will continue to monitor pain control. Discussed plan of care with patient, to have UGI in the morning and if normal will be able to start Phase 1 clear liquid diet. The care plan and education has been reviewed and mutually agreed upon with the patient.

## 2023-07-10 NOTE — PROGRESS NOTES
Dilaudid PCA started. Instructed pt on use. Pt demonstrates understanding. IVF infusing. Pt cont to c/o nausea.  Compazine IV ordered per Dr Loki Reid

## 2023-07-10 NOTE — DISCHARGE INSTRUCTIONS
Unless otherwise noted you will crush all your medications for the first 2 weeks post op and mix them with clears for the first four days and then with your pureed food for the remainder of the 2 weeks. If you do not tolerate your medications crushed you may quarter or cut in half and take one piece at a time. You may take your pantoprazole (Protonix) whole as it cannot be crushed. After two weeks, you may start taking all your pills whole. Please make sure when taking whole pills, you do not take them all at once. Take them one at a time and allow a minute or so between each one. You may restart your Vitamin D, folic acid and iron supplements in 2 weeks when you are able to take whole pills again. I have stopped your home dose of Subutex SL and have prescribed Percocet for acute surgical pain. Do not take both at the same time. You may restart your home dose of Subutex SL after you are no longer taking the Percocet. Activity  You are encouraged to walk through the entire postoperative period as this will help with preventing clots, pneumonia and constipation. You are restricted from lifting anything greater than 10 lbs for the first 6 weeks after surgery. You may shower starting on postoperative day #2 (second day after surgery), but should not get into baths, pools and/or hot tubs until the provider releases you to do so. Driving  You should not drive for at least the first week after your surgery and/or if you are still taking pain medication. Most patients generally drive to their 2-week postoperative appointment. Constipation  Constipation can occur following surgery. This is due to anesthesia, decreased fluids and fiber in your diet, drinking protein shakes and taking pain medication. Make sure you are getting 48-64 ounces of fluids per day and walking.  If you develop constipation you may use docusate sodium (Colace) or Dulcolax which are over-the-counter stool softeners and can be taken 1-2 times per

## 2023-07-11 ENCOUNTER — APPOINTMENT (OUTPATIENT)
Dept: GENERAL RADIOLOGY | Age: 43
DRG: 620 | End: 2023-07-11
Attending: SURGERY
Payer: COMMERCIAL

## 2023-07-11 VITALS
OXYGEN SATURATION: 94 % | HEART RATE: 76 BPM | DIASTOLIC BLOOD PRESSURE: 72 MMHG | TEMPERATURE: 98 F | BODY MASS INDEX: 43.71 KG/M2 | RESPIRATION RATE: 12 BRPM | HEIGHT: 64 IN | SYSTOLIC BLOOD PRESSURE: 130 MMHG | WEIGHT: 256 LBS

## 2023-07-11 LAB
ANION GAP SERPL CALCULATED.3IONS-SCNC: 11 MMOL/L (ref 3–16)
BUN SERPL-MCNC: 5 MG/DL (ref 7–20)
CALCIUM SERPL-MCNC: 8.6 MG/DL (ref 8.3–10.6)
CHLORIDE SERPL-SCNC: 105 MMOL/L (ref 99–110)
CO2 SERPL-SCNC: 23 MMOL/L (ref 21–32)
CREAT SERPL-MCNC: <0.5 MG/DL (ref 0.6–1.1)
DEPRECATED RDW RBC AUTO: 14.6 % (ref 12.4–15.4)
GFR SERPLBLD CREATININE-BSD FMLA CKD-EPI: >60 ML/MIN/{1.73_M2}
GLUCOSE SERPL-MCNC: 106 MG/DL (ref 70–99)
HCT VFR BLD AUTO: 36.4 % (ref 36–48)
HGB BLD-MCNC: 12 G/DL (ref 12–16)
MCH RBC QN AUTO: 27.7 PG (ref 26–34)
MCHC RBC AUTO-ENTMCNC: 33 G/DL (ref 31–36)
MCV RBC AUTO: 83.9 FL (ref 80–100)
PLATELET # BLD AUTO: 243 K/UL (ref 135–450)
PMV BLD AUTO: 8.6 FL (ref 5–10.5)
POTASSIUM SERPL-SCNC: 4.5 MMOL/L (ref 3.5–5.1)
RBC # BLD AUTO: 4.33 M/UL (ref 4–5.2)
SODIUM SERPL-SCNC: 139 MMOL/L (ref 136–145)
WBC # BLD AUTO: 15.8 K/UL (ref 4–11)

## 2023-07-11 PROCEDURE — 6360000002 HC RX W HCPCS: Performed by: NURSE PRACTITIONER

## 2023-07-11 PROCEDURE — 74240 X-RAY XM UPR GI TRC 1CNTRST: CPT

## 2023-07-11 PROCEDURE — 99024 POSTOP FOLLOW-UP VISIT: CPT | Performed by: NURSE PRACTITIONER

## 2023-07-11 PROCEDURE — C9113 INJ PANTOPRAZOLE SODIUM, VIA: HCPCS | Performed by: SURGERY

## 2023-07-11 PROCEDURE — APPSS180 APP SPLIT SHARED TIME > 60 MINUTES: Performed by: NURSE PRACTITIONER

## 2023-07-11 PROCEDURE — 94761 N-INVAS EAR/PLS OXIMETRY MLT: CPT

## 2023-07-11 PROCEDURE — 80048 BASIC METABOLIC PNL TOTAL CA: CPT

## 2023-07-11 PROCEDURE — 6370000000 HC RX 637 (ALT 250 FOR IP): Performed by: SURGERY

## 2023-07-11 PROCEDURE — 85027 COMPLETE CBC AUTOMATED: CPT

## 2023-07-11 PROCEDURE — 6360000004 HC RX CONTRAST MEDICATION

## 2023-07-11 PROCEDURE — 36415 COLL VENOUS BLD VENIPUNCTURE: CPT

## 2023-07-11 PROCEDURE — 6360000002 HC RX W HCPCS: Performed by: SURGERY

## 2023-07-11 PROCEDURE — 2580000003 HC RX 258: Performed by: SURGERY

## 2023-07-11 RX ORDER — PROMETHAZINE HYDROCHLORIDE 6.25 MG/5ML
12.5 SYRUP ORAL EVERY 6 HOURS PRN
Status: DISCONTINUED | OUTPATIENT
Start: 2023-07-11 | End: 2023-07-11 | Stop reason: HOSPADM

## 2023-07-11 RX ORDER — OXYCODONE HYDROCHLORIDE AND ACETAMINOPHEN 5; 325 MG/1; MG/1
1 TABLET ORAL EVERY 6 HOURS PRN
Qty: 28 TABLET | Refills: 0 | Status: SHIPPED | OUTPATIENT
Start: 2023-07-11 | End: 2023-07-18

## 2023-07-11 RX ORDER — OXYCODONE HYDROCHLORIDE AND ACETAMINOPHEN 5; 325 MG/1; MG/1
1 TABLET ORAL EVERY 4 HOURS PRN
Status: DISCONTINUED | OUTPATIENT
Start: 2023-07-11 | End: 2023-07-11 | Stop reason: HOSPADM

## 2023-07-11 RX ORDER — OXYCODONE HYDROCHLORIDE AND ACETAMINOPHEN 5; 325 MG/1; MG/1
2 TABLET ORAL EVERY 4 HOURS PRN
Status: DISCONTINUED | OUTPATIENT
Start: 2023-07-11 | End: 2023-07-11 | Stop reason: HOSPADM

## 2023-07-11 RX ORDER — ONDANSETRON 8 MG/1
8 TABLET, ORALLY DISINTEGRATING ORAL EVERY 8 HOURS PRN
Qty: 30 TABLET | Refills: 0 | Status: SHIPPED | OUTPATIENT
Start: 2023-07-11

## 2023-07-11 RX ORDER — ONDANSETRON 8 MG/1
8 TABLET, ORALLY DISINTEGRATING ORAL EVERY 8 HOURS PRN
Status: DISCONTINUED | OUTPATIENT
Start: 2023-07-11 | End: 2023-07-11 | Stop reason: HOSPADM

## 2023-07-11 RX ORDER — ONDANSETRON 8 MG/1
8 TABLET, ORALLY DISINTEGRATING ORAL EVERY 8 HOURS PRN
Qty: 30 TABLET | Refills: 1 | Status: SHIPPED | OUTPATIENT
Start: 2023-07-11

## 2023-07-11 RX ORDER — KETOROLAC TROMETHAMINE 30 MG/ML
15 INJECTION, SOLUTION INTRAMUSCULAR; INTRAVENOUS EVERY 6 HOURS
Status: COMPLETED | OUTPATIENT
Start: 2023-07-11 | End: 2023-07-11

## 2023-07-11 RX ADMIN — SODIUM CHLORIDE, PRESERVATIVE FREE 40 MG: 5 INJECTION INTRAVENOUS at 08:04

## 2023-07-11 RX ADMIN — IOHEXOL 50 ML: 350 INJECTION, SOLUTION INTRAVENOUS at 09:31

## 2023-07-11 RX ADMIN — METOCLOPRAMIDE 10 MG: 5 INJECTION, SOLUTION INTRAMUSCULAR; INTRAVENOUS at 12:30

## 2023-07-11 RX ADMIN — SODIUM CHLORIDE, POTASSIUM CHLORIDE, SODIUM LACTATE AND CALCIUM CHLORIDE: 600; 310; 30; 20 INJECTION, SOLUTION INTRAVENOUS at 01:14

## 2023-07-11 RX ADMIN — KETOROLAC TROMETHAMINE 15 MG: 30 INJECTION, SOLUTION INTRAMUSCULAR; INTRAVENOUS at 11:58

## 2023-07-11 RX ADMIN — ENOXAPARIN SODIUM 30 MG: 100 INJECTION SUBCUTANEOUS at 08:05

## 2023-07-11 RX ADMIN — ONDANSETRON 4 MG: 2 INJECTION INTRAMUSCULAR; INTRAVENOUS at 06:21

## 2023-07-11 RX ADMIN — METOCLOPRAMIDE 10 MG: 5 INJECTION, SOLUTION INTRAMUSCULAR; INTRAVENOUS at 01:13

## 2023-07-11 ASSESSMENT — PAIN SCALES - GENERAL
PAINLEVEL_OUTOF10: 6
PAINLEVEL_OUTOF10: 7
PAINLEVEL_OUTOF10: 5

## 2023-07-11 ASSESSMENT — PAIN - FUNCTIONAL ASSESSMENT: PAIN_FUNCTIONAL_ASSESSMENT: ACTIVITIES ARE NOT PREVENTED

## 2023-07-11 ASSESSMENT — PAIN DESCRIPTION - LOCATION
LOCATION: ABDOMEN
LOCATION: ABDOMEN

## 2023-07-11 ASSESSMENT — PAIN DESCRIPTION - DESCRIPTORS: DESCRIPTORS: ACHING

## 2023-07-11 ASSESSMENT — PAIN DESCRIPTION - ORIENTATION: ORIENTATION: MID

## 2023-07-11 NOTE — PROGRESS NOTES
07/11/23 0929   Incentive Spirometry Tx   Treatment Effort Subsequent;Assisted by RT; Independent; Instructed; Well   Predicted Volume 547   Achieved Volume (mL) 1000 mL

## 2023-07-11 NOTE — PLAN OF CARE
Problem: Discharge Planning  Goal: Discharge to home or other facility with appropriate resources  7/11/2023 0102 by Sadie Aaron RN  Outcome: Progressing  Flowsheets  Taken 7/10/2023 2015 by Sadie Aaron RN  Discharge to home or other facility with appropriate resources: Identify barriers to discharge with patient and caregiver  Taken 7/10/2023 1225 by Kel Hearn RN  Discharge to home or other facility with appropriate resources: Arrange for needed discharge resources and transportation as appropriate  Taken 7/10/2023 1208 by Kel Hearn RN  Discharge to home or other facility with appropriate resources: Arrange for needed discharge resources and transportation as appropriate  7/10/2023 1158 by Kel Hearn RN  Outcome: Progressing     Problem: Pain  Goal: Verbalizes/displays adequate comfort level or baseline comfort level  7/11/2023 0102 by Sadie Aaron RN  Outcome: Progressing  7/10/2023 1158 by Kel Hearn RN  Outcome: Progressing     Problem: Skin/Tissue Integrity - Adult  Goal: Skin integrity remains intact  Outcome: Progressing  Goal: Incisions, wounds, or drain sites healing without S/S of infection  Outcome: Progressing     Problem: Gastrointestinal - Adult  Goal: Minimal or absence of nausea and vomiting  Outcome: Progressing  Goal: Maintains or returns to baseline bowel function  Outcome: Progressing

## 2023-07-11 NOTE — PLAN OF CARE
Problem: Discharge Planning  Goal: Discharge to home or other facility with appropriate resources  7/11/2023 0807 by Deniz Scott RN  Outcome: Progressing     Problem: Pain  Goal: Verbalizes/displays adequate comfort level or baseline comfort level  7/11/2023 0807 by Deniz Scott RN  Outcome: Progressing     Problem: Skin/Tissue Integrity - Adult  Goal: Skin integrity remains intact  7/11/2023 0807 by Deniz Soctt RN  Outcome: Progressing     Problem: Skin/Tissue Integrity - Adult  Goal: Incisions, wounds, or drain sites healing without S/S of infection  7/11/2023 0807 by Deniz Scott RN  Outcome: Progressing     Problem: Gastrointestinal - Adult  Goal: Minimal or absence of nausea and vomiting  7/11/2023 0807 by Deniz Scott RN  Outcome: Progressing     Problem: Gastrointestinal - Adult  Goal: Maintains or returns to baseline bowel function  7/11/2023 0807 by Deniz Scott RN  Outcome: Progressing

## 2023-07-11 NOTE — PROGRESS NOTES
Patient s/p sleeve gastrectomy. VSS. Abdomen soft, appropriately tender, incision stable with no erythema. Patient ambulating in hallway, with binder in place, every 60-90 minutes. SCD's in place while in bed. I/O being monitored. Patient instructed on proper use and frequency of incentive spirometer. Patient completed UGI this morning and was normal.  Patient started on Phase I clear liquid diet. PCA discontinued and patient started on oral pain medications. Will continue to monitor pain control. Will monitor patient's oral intake throughout the day. The care plan and education has been reviewed and mutually agreed upon with the patient.

## 2023-07-11 NOTE — PROGRESS NOTES
[facility]) Physicians   General & Laparoscopic Surgery  [employer]       Pt seen and examined    S/p laparoscopic sleeve gastrectomy     The patient is ambulating in [place]. Pain is well controlled. There is some nausea, but no vomiting. There are no other complaints or questions. Vitals:    07/10/23 2215 07/11/23 0100 07/11/23 0345 07/11/23 0600   BP: 126/81 132/84 128/75 123/71   Pulse: 63 93 60 60   Resp: 12 12 10    Temp:       TempSrc:       SpO2: 97% 95% 96% 97%   Weight:       Height:         Abdomen is soft, appropriately tender, incisions stable with no erythema. Breath sounds are clear bilaterally. Bowel sounds are hypoactive in all quadrants.     Data  CBC:   Lab Results   Component Value Date/Time    WBC 15.8 07/11/2023 05:50 AM    RBC 4.33 07/11/2023 05:50 AM    HGB 12.0 07/11/2023 05:50 AM    HCT 36.4 07/11/2023 05:50 AM    MCV 83.9 07/11/2023 05:50 AM    MCH 27.7 07/11/2023 05:50 AM    MCHC 33.0 07/11/2023 05:50 AM    RDW 14.6 07/11/2023 05:50 AM     07/11/2023 05:50 AM    MPV 8.6 07/11/2023 05:50 AM     BMP:    Lab Results   Component Value Date/Time     07/11/2023 05:50 AM    K 4.5 07/11/2023 05:50 AM     07/11/2023 05:50 AM    CO2 23 07/11/2023 05:50 AM    BUN 5 07/11/2023 05:50 AM    LABALBU 4.5 07/07/2023 08:50 AM    CREATININE <0.5 07/11/2023 05:50 AM    CALCIUM 8.6 07/11/2023 05:50 AM    GFRAA >60 04/04/2017 10:58 AM    GFRAA >60 06/29/2012 06:18 PM    LABGLOM >60 07/11/2023 05:50 AM    GLUCOSE 106 07/11/2023 05:50 AM     Current Inpatient Medications    Current Facility-Administered Medications: diphenhydrAMINE (BENADRYL) injection 12.5 mg, 12.5 mg, IntraVENous, Q6H PRN  hydrALAZINE (APRESOLINE) injection 10 mg, 10 mg, IntraVENous, Q2H PRN  hyoscyamine (LEVSIN) 500 MCG/ML injection 250 mcg, 250 mcg, IntraVENous, Q4H PRN  labetalol (NORMODYNE;TRANDATE) injection 10 mg, 10 mg, IntraVENous, Q2H PRN  lidocaine 4 % external patch 1 patch, 1 patch,

## 2023-07-11 NOTE — PLAN OF CARE
Problem: Discharge Planning  Goal: Discharge to home or other facility with appropriate resources  7/11/2023 1549 by Haider Miles RN  Outcome: Completed     Problem: Pain  Goal: Verbalizes/displays adequate comfort level or baseline comfort level  7/11/2023 1549 by Haider Miles RN  Outcome: Completed     Problem: Skin/Tissue Integrity - Adult  Goal: Skin integrity remains intact  7/11/2023 1549 by Haider Miles RN  Outcome: Completed     Problem: Skin/Tissue Integrity - Adult  Goal: Incisions, wounds, or drain sites healing without S/S of infection  7/11/2023 1549 by Haider Miles RN  Outcome: Completed     Problem: Gastrointestinal - Adult  Goal: Minimal or absence of nausea and vomiting  7/11/2023 1549 by Haider Miles RN  Outcome: Completed     Problem: Gastrointestinal - Adult  Goal: Maintains or returns to baseline bowel function  7/11/2023 1549 by Haider Miles RN  Outcome: Completed

## 2023-07-11 NOTE — PROGRESS NOTES
CLINICAL PHARMACY NOTE: MEDS TO BEDS    Total # of Prescriptions Filled: 2   The following medications were delivered to the patient:  OXYCODONE - ACETAMINOPHEN5  ONDANSETRON 8MG    Additional Documentation:  DELIVERED TO PATIENTS ROOM = SIGNED  OK TO BE DELIVERED PER ALETHA Velez.

## 2023-07-11 NOTE — PROGRESS NOTES
Discharge instructions gone over with patient. All questions answered. IV removed with no complications. Patient taken out for discharge via wheelchair.

## 2023-07-11 NOTE — DISCHARGE SUMMARY
The patient was admitted on day of surgery and underwent a laparoscopic sleeve gastrectomy. Surgery went well and the patient went to our post-op bariatric floor. Overnight, the patient had stable vitals signs, good urine output and ambulated in the halls. On POD #1 the patient underwent an UGI which revealed no leak or obstruction. Phase I diet was started and the patient tolerated well. The patient has no N/V, tolerating diet, ambulating and pain controlled on oral medication. The patient was discharged home today in stable condition. The patient will f/u in 2 weeks and was given dietary and ambulation instruction. The patient was instructed to call if there are any questions or concerns.      Patient Active Problem List    Diagnosis Date Noted    Dyslipidemia (high LDL; low HDL) 02/09/2023     Priority: Medium    Chronic GERD 10/27/2022     Priority: Medium    Morbid obesity with BMI of 40.0-44.9, adult ([address]) 10/27/2022     Priority: Medium    Incarcerated incisional hernia 07/10/2023    S/P laparoscopic sleeve gastrectomy 07/10/2023

## 2023-07-17 ENCOUNTER — TELEPHONE (OUTPATIENT)
Dept: BARIATRICS/WEIGHT MGMT | Age: 43
End: 2023-07-17

## 2023-07-17 NOTE — TELEPHONE ENCOUNTER
Looks like patient is doing well with intake. Agree with recommendations and next follow up can be at 2 week post-op visit.   Thank you,  TOMASA RomanC

## 2023-07-17 NOTE — TELEPHONE ENCOUNTER
I attempted to contact the patient to follow up with them regarding their recent surgery. I have left a voicemail for the patient to return our call. If she calls back  please place message to myself and the dietitians. Thanks!

## 2023-07-17 NOTE — TELEPHONE ENCOUNTER
Surgery Type: sleeve     Surgery Date: 7/10/23    Surgeon: Eliana Millan    The patient was contacted to follow up on their recent bariatric surgery. The following topics were reviewed:    [x] Hydration is Adequate            [x]Patient is getting at least 48-64 oz of fluids a day, not including protein shakes. [x]Consuming Adequate Protein         [x]Consuming 2 protein shakes a day         [x]Consuming equal to 60-80 grams of protein a day   Mixing protein powder with 6 ounces of  1% milk . [x] Food intake is appropriate   Started 2 days ago. Mash potato mixed with lean turkey w/ beef boullion. Using a baby food  with small. [x] Adequately pureeing foods, so that there are no chunks left. [x] Taking in 1-2 oz at a time  [x] Pt is eating 3 times per day  [x] Following the 30-30-30 rule. - Patient is eating pureed food over 30 minute timeframe.    - Patient is  fluids out from food by 30 minutes. [x] Reminded patient to keep food diary to bring to their 2 week follow up appointment. [x] Pain relief techniques utilized   [] Taking pain medication as prescribed  [] Utilizing Lidoderm patches (if prescribed)  []Taking Tylenol instead of prescription pain medication  [x] Wearing abdominal binder  [] Using ice for incisional pain   -some discomfort around only 1 incision. Hasn't use ice in the last 2 days. Discussed lidocaine patches, icing, and tylenol dosaging as options. [] Activity is appropriate  [x] Walking 10 minutes out of every hour  [x] Avoiding heavy lifting (>10lbs)  [x] Utilizing their incentive spirometer   [x]  is using 10 times per hour about 5 hours per day. [x] Issues with Nausea/Vomiting/Reflux  [x] Using Zofran PRN for nausea/vomiting   [] Taking Prilosec for reflux  Experiencing sensation under left breast and rib, toward her left side. Independent of any activity. Zofran does help. Unsure if reflux vs gas. Discussed that if gas, walking may help.  Discussed

## 2023-07-24 ENCOUNTER — OFFICE VISIT (OUTPATIENT)
Dept: BARIATRICS/WEIGHT MGMT | Age: 43
End: 2023-07-24

## 2023-07-24 VITALS
HEART RATE: 87 BPM | OXYGEN SATURATION: 95 % | WEIGHT: 248 LBS | DIASTOLIC BLOOD PRESSURE: 77 MMHG | SYSTOLIC BLOOD PRESSURE: 105 MMHG | BODY MASS INDEX: 42.34 KG/M2 | HEIGHT: 64 IN

## 2023-07-24 DIAGNOSIS — E78.5 DYSLIPIDEMIA (HIGH LDL; LOW HDL): ICD-10-CM

## 2023-07-24 DIAGNOSIS — A04.8 HELICOBACTER PYLORI (H. PYLORI) INFECTION: ICD-10-CM

## 2023-07-24 DIAGNOSIS — E66.01 MORBID OBESITY WITH BMI OF 40.0-44.9, ADULT (HCC): ICD-10-CM

## 2023-07-24 DIAGNOSIS — K21.9 CHRONIC GERD: Primary | ICD-10-CM

## 2023-07-24 DIAGNOSIS — Z98.84 S/P LAPAROSCOPIC SLEEVE GASTRECTOMY: ICD-10-CM

## 2023-07-24 PROCEDURE — 99024 POSTOP FOLLOW-UP VISIT: CPT | Performed by: NURSE PRACTITIONER

## 2023-07-24 RX ORDER — PANTOPRAZOLE SODIUM 40 MG/1
40 TABLET, DELAYED RELEASE ORAL DAILY
Qty: 30 TABLET | Refills: 3 | Status: SHIPPED | OUTPATIENT
Start: 2023-07-24

## 2023-07-24 RX ORDER — AMOXICILLIN 500 MG/1
1000 CAPSULE ORAL 2 TIMES DAILY
Qty: 56 CAPSULE | Refills: 0 | Status: SHIPPED | OUTPATIENT
Start: 2023-07-24 | End: 2023-08-07

## 2023-07-24 RX ORDER — CLARITHROMYCIN 500 MG/1
500 TABLET, COATED ORAL 2 TIMES DAILY
Qty: 28 TABLET | Refills: 0 | Status: SHIPPED | OUTPATIENT
Start: 2023-07-24 | End: 2023-08-07

## 2023-07-24 NOTE — PROGRESS NOTES
Dietary Assessment Note      Vitals:   Vitals:    23 0948   BP: 99/80   Pulse: 87   SpO2: 95%   Weight: 248 lb (112.5 kg)   Height: 5' 4\" (1.626 m)    Patient lost 15 lbs over 2 months. Total Weight Loss: 26 lbs    Labs reviewed: labs reviewed from 23, I note BUN (5 mg/dL), Creatinine (<0.5 mg/dL), Glucose (106 mg/dL)    Protein intake: 60-80 grams/day     Fluid intake: 48-64 oz/day    Multivitamin/mineral intake: yes Fusion capsule w iron    Calcium intake: no    Other: biotin     Exercise: walking     Nutrition Assessment: 2 week post-op visit. Pt has been following sample schedule in pre-op folder. B: 2 oz ff CC   S: Protein powder w 6 oz 1% milk    L: 2 oz baked chix + unsweetened applesauce or mashed potatoes  S: Protein powder w 6 oz 1% milk     D: 2 oz chix or lean ground turkey w beef boullion + green beans   S: none OR yogurt OR sf pudding OR unsweetened apple sauce    Drinks: 60 oz water/day      Amount able to eat per sittin-3 oz total     Following  rule: Yes    - fluids from foods: Yes   -Eating over 30 minutes. Food Intolerances/issues: ground turkey w seasoning- acid reflux     Client Concerns: none, darker urine x several days     Goals: Continue current nutrition treatment plan.   -Advance to phase 3 on 23  -Opt for boiled/ steamed cooking methods vs baked  -Take MVI as directed  -Continue physical activity as tolerated- lifting restriction until 6 weeks post-op    Handouts:   Phase 3 diet     Plan: Follow up at 6 week post op and as needed    Ismael Love RD
post-op visit when she will be better able to tolerate the antibiotics. We will see her back in 4 weeks for continued follow up.

## 2023-08-24 ENCOUNTER — OFFICE VISIT (OUTPATIENT)
Dept: BARIATRICS/WEIGHT MGMT | Age: 43
End: 2023-08-24

## 2023-08-24 VITALS
BODY MASS INDEX: 39.44 KG/M2 | OXYGEN SATURATION: 98 % | SYSTOLIC BLOOD PRESSURE: 118 MMHG | HEART RATE: 82 BPM | WEIGHT: 231 LBS | HEIGHT: 64 IN | DIASTOLIC BLOOD PRESSURE: 72 MMHG | RESPIRATION RATE: 18 BRPM

## 2023-08-24 DIAGNOSIS — Z98.84 S/P LAPAROSCOPIC SLEEVE GASTRECTOMY: Primary | ICD-10-CM

## 2023-08-24 PROCEDURE — 99024 POSTOP FOLLOW-UP VISIT: CPT | Performed by: SURGERY

## 2023-08-24 RX ORDER — ONDANSETRON 8 MG/1
8 TABLET, ORALLY DISINTEGRATING ORAL EVERY 8 HOURS PRN
Qty: 30 TABLET | Refills: 1 | Status: SHIPPED | OUTPATIENT
Start: 2023-08-24

## 2023-08-24 NOTE — PROGRESS NOTES
Dietary Assessment Note      Vitals:   Vitals:    23 0853   Resp: 18   Weight: 231 lb (104.8 kg)   Height: 5' 4\" (1.626 m)    Patient lost 17 lbs over 1 month. Total Weight Loss: 43 lbs    Labs reviewed: labs are reviewed, up to date and normal, no lab studies available for review at time of visit    Protein intake: 60-80 grams/day may use 1 shake/day    Fluid intake: 48-64 oz/day water/CL    Multivitamin/mineral intake: Fusion capsule MVI w/ Iron    Calcium intake: Fusion Calcium soft chews 1/day     Other: Vitamin D3 hasn't restarted     Exercise:  walking daily    Nutrition Assessment: 7 weeks post-op visit. Breakfast: 1 Egg  Snack: None   Lunch: Tuna + sometimes 1/2 string cheese or few bites   Snack: 2 String cheese OR cheese/cracker snac pack  Dinner: Ribs (no sauce) + mashed pots  Snack: None    Amount able to eat per sittin oz container maybe 3 oz total    Following 3030/30 rule: Eating over 30 minutes. Waits 30 minutes between eating and drinking.     Food Intolerances/issues:  repost nausea at baseline, not worse with eating     Client Concerns: none    Goals:   - Advance to phase 4 diet - increase portions as tolerated   - Take 1 Fusion capsule with Iron/day and 2 Fusion calcium soft chews/day  - Plan for 3 days of exercise per week    Plan: Follow up at 14 weeks post op and as needed    Lora Rizo RD, LD
and also not following it could end with serious health complications. I advised Mouna Sabillon to gradually advance activity and  to call if there are any questions or concerns. Mouna Sabillon will follow up in 8 weeks. Please note that some or all of this report was generated using voice recognition software. Please notify me in case of any questions about the content of this document, as some errors in transcription may have occurred .       Electronically signed by Larissa Castellano MD , FACS, RICKEY  on 8/24/2023 at 9:19 AM

## 2023-08-24 NOTE — PATIENT INSTRUCTIONS
Patient received dietary handouts and education.     Goals:   - Advance to phase 4 diet - increase portions as tolerated   - Take 1 Fusion capsule with Iron/day and 2 Fusion calcium soft chews/day  - Plan for 3 days of exercise per week

## 2023-12-07 ENCOUNTER — OFFICE VISIT (OUTPATIENT)
Dept: BARIATRICS/WEIGHT MGMT | Age: 43
End: 2023-12-07
Payer: COMMERCIAL

## 2023-12-07 VITALS
HEIGHT: 65 IN | BODY MASS INDEX: 34.49 KG/M2 | HEART RATE: 80 BPM | RESPIRATION RATE: 18 BRPM | DIASTOLIC BLOOD PRESSURE: 62 MMHG | WEIGHT: 207 LBS | SYSTOLIC BLOOD PRESSURE: 104 MMHG | OXYGEN SATURATION: 98 %

## 2023-12-07 DIAGNOSIS — Z98.84 S/P LAPAROSCOPIC SLEEVE GASTRECTOMY: Primary | ICD-10-CM

## 2023-12-07 DIAGNOSIS — E78.5 DYSLIPIDEMIA (HIGH LDL; LOW HDL): ICD-10-CM

## 2023-12-07 DIAGNOSIS — K21.9 CHRONIC GERD: ICD-10-CM

## 2023-12-07 PROCEDURE — 1036F TOBACCO NON-USER: CPT | Performed by: SURGERY

## 2023-12-07 PROCEDURE — G8427 DOCREV CUR MEDS BY ELIG CLIN: HCPCS | Performed by: SURGERY

## 2023-12-07 PROCEDURE — 99214 OFFICE O/P EST MOD 30 MIN: CPT | Performed by: SURGERY

## 2023-12-07 PROCEDURE — G8417 CALC BMI ABV UP PARAM F/U: HCPCS | Performed by: SURGERY

## 2023-12-07 PROCEDURE — G8484 FLU IMMUNIZE NO ADMIN: HCPCS | Performed by: SURGERY

## 2023-12-07 NOTE — PROGRESS NOTES
Reviewed pt chart and discussed plan with provider. Will continue to follow.      Tye Hodgkin, RD, LD

## 2024-01-01 NOTE — CARE COORDINATION
Child Life Note: Unavailable for Services    Pt and family unavailable at time of check in for Child Life services. Child Life services will remain available to provide support to pt and family throughout remainder of admission.    Annie Mistry MS, CCLS  Child Life Department - PED       Discharge Planning Note:    Chart reviewed and it appears that patient has minimal needs for discharge at this time. Discussed with patient and requested that case management be notified if discharge needs are identified.     - Current discharge plan is for the patient to return home. Case management will continue to follow progress and update discharge plan as needed.       Risk of Readmission Score: 4%    DANISH ReynaN RN    [facility]  Phone: [phone]

## 2024-03-28 ENCOUNTER — TELEPHONE (OUTPATIENT)
Dept: BARIATRICS/WEIGHT MGMT | Age: 44
End: 2024-03-28

## 2024-03-28 NOTE — TELEPHONE ENCOUNTER
Tried to call pt to reschedule appt that she asked about in [facility]-  full. Please schedule pt in may- April is full.

## 2025-01-15 ENCOUNTER — APPOINTMENT (OUTPATIENT)
Dept: GENERAL RADIOLOGY | Age: 45
End: 2025-01-15
Payer: OTHER MISCELLANEOUS

## 2025-01-15 ENCOUNTER — HOSPITAL ENCOUNTER (EMERGENCY)
Age: 45
Discharge: HOME | End: 2025-01-15
Payer: OTHER MISCELLANEOUS

## 2025-01-15 ENCOUNTER — APPOINTMENT (OUTPATIENT)
Dept: CT IMAGING | Age: 45
End: 2025-01-15
Payer: OTHER MISCELLANEOUS

## 2025-01-15 VITALS
HEART RATE: 68 BPM | WEIGHT: 193 LBS | RESPIRATION RATE: 16 BRPM | DIASTOLIC BLOOD PRESSURE: 76 MMHG | HEIGHT: 65 IN | TEMPERATURE: 98.4 F | SYSTOLIC BLOOD PRESSURE: 128 MMHG | BODY MASS INDEX: 32.15 KG/M2 | OXYGEN SATURATION: 96 %

## 2025-01-15 DIAGNOSIS — S90.111A CONTUSION OF RIGHT GREAT TOE WITHOUT DAMAGE TO NAIL, INITIAL ENCOUNTER: ICD-10-CM

## 2025-01-15 DIAGNOSIS — Z23 NEED FOR TDAP VACCINATION: ICD-10-CM

## 2025-01-15 DIAGNOSIS — S80.212A ABRASION OF LEFT KNEE, INITIAL ENCOUNTER: ICD-10-CM

## 2025-01-15 DIAGNOSIS — S80.02XA CONTUSION OF LEFT KNEE, INITIAL ENCOUNTER: ICD-10-CM

## 2025-01-15 DIAGNOSIS — S16.1XXA STRAIN OF NECK MUSCLE, INITIAL ENCOUNTER: ICD-10-CM
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
## 2025-01-15 PROCEDURE — 99284 EMERGENCY DEPT VISIT MOD MDM: CPT

## 2025-01-15 PROCEDURE — 73630 X-RAY EXAM OF FOOT: CPT

## 2025-01-15 PROCEDURE — 6370000000 HC RX 637 (ALT 250 FOR IP): Performed by: PHYSICIAN ASSISTANT

## 2025-01-15 PROCEDURE — 70450 CT HEAD/BRAIN W/O DYE: CPT

## 2025-01-15 PROCEDURE — 72125 CT NECK SPINE W/O DYE: CPT

## 2025-01-15 RX ORDER — ACETAMINOPHEN 500 MG
1000 TABLET ORAL EVERY 8 HOURS PRN
Qty: 60 TABLET | Refills: 0 | Status: SHIPPED | OUTPATIENT
Start: 2025-01-15 | End: 2025-01-25

## 2025-01-15 RX ORDER — IBUPROFEN 600 MG/1
600 TABLET, FILM COATED ORAL 3 TIMES DAILY PRN
Qty: 30 TABLET | Refills: 0 | Status: SHIPPED | OUTPATIENT
Start: 2025-01-15

## 2025-01-15 RX ORDER — IBUPROFEN 400 MG/1
400 TABLET, FILM COATED ORAL ONCE
Status: DISCONTINUED | OUTPATIENT
Start: 2025-01-15 | End: 2025-01-15 | Stop reason: HOSPADM

## 2025-01-15 RX ORDER — ACETAMINOPHEN 500 MG
1000 TABLET ORAL ONCE
Status: COMPLETED | OUTPATIENT
Start: 2025-01-15 | End: 2025-01-15

## 2025-01-15 RX ADMIN — ACETAMINOPHEN 1000 MG: 500 TABLET ORAL at 12:27

## 2025-01-15 ASSESSMENT — PAIN DESCRIPTION - DESCRIPTORS: DESCRIPTORS: THROBBING

## 2025-01-15 ASSESSMENT — PAIN DESCRIPTION - LOCATION: LOCATION: HEAD

## 2025-01-15 ASSESSMENT — PAIN - FUNCTIONAL ASSESSMENT
PAIN_FUNCTIONAL_ASSESSMENT: 0-10
PAIN_FUNCTIONAL_ASSESSMENT: 0-10

## 2025-01-15 ASSESSMENT — PAIN DESCRIPTION - PAIN TYPE: TYPE: ACUTE PAIN

## 2025-01-15 ASSESSMENT — PAIN SCALES - GENERAL
PAINLEVEL_OUTOF10: 6
PAINLEVEL_OUTOF10: 2
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
## 2025-01-15 NOTE — ED NOTES
Rounding done at this time, patient resting in bed with eyes closed. Respirations easy and even. No needs at this time. Will continue to monitor.

## 2025-01-29 NOTE — ED PROVIDER NOTES
[address] ED      CHIEF COMPLAINT  Allergic Reaction (Pt states that she noticed hives on her back, buttocks, and right thigh last night and today she feels like there is a lump in her throat that started 30 minutes ago. Pt denies difficulty breathing but states that is is a little more difficult to swallow. Pt took 50mg of benadryl about 30 minutes PTA. )       HISTORY OF PRESENT ILLNESS  Loni Saravia is a 37 y.o. female  who presents to the ED complaining of bumps to buttocks and lower back. Thought they were maybe bites but today spread to UE and now looks like hives. Can't identify trigger. No new soaps, lotions or foods. MD started her on some new meds but had had them all before, just restarted on them. Used an albuterol inhaler last night. No camping or time in the woods. 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 No n/v. Feels like a \"lump\" is in her throat. Took 50mg benadryl po at 10am, no improvement. Has dyspnea at baseline, no significant worsening. Also took a flexeril last night but had had that before, just has been a while. Hasn't been outside in the heat much. No other complaints, modifying factors or associated symptoms. I have reviewed the following from the nursing documentation. Past Medical History:   Diagnosis Date    Anxiety     GERD (gastroesophageal reflux disease)     Leg pain, bilateral     Migraine     Overweight      Past Surgical History:   Procedure Laterality Date     SECTION      x 5    OTHER SURGICAL HISTORY  2014    laparoscopic cholecystectomy    TUBAL LIGATION      WISDOM TOOTH EXTRACTION       History reviewed. No pertinent family history.   Social History     Socioeconomic History    Marital status:      Spouse name: Jordyn Peters Number of children: 11    Years of education: Not on file    Highest education level: Not on file   Occupational History    Not on file   Tobacco Spoke w/ Pt's brother, aware that they need to be to PCP appt for 10:20, he is getting pt ready at this time.    Use    Smoking status: Current Every Day Smoker     Packs/day: 1.50     Years: 19.00     Pack years: 28.50     Types: Cigarettes    Smokeless tobacco: Never Used   Substance and Sexual Activity    Alcohol use: Not Currently     Comment: occasional    Drug use: No    Sexual activity: Yes     Partners: Male     Comment: no birthcontrol   Other Topics Concern    Not on file   Social History Narrative    Not on file     Social Determinants of Health     Financial Resource Strain:     Difficulty of Paying Living Expenses: Not on file   Food Insecurity:     Worried About Running Out of Food in the Last Year: Not on file    Jacey of Food in the Last Year: Not on file   Transportation Needs:     Lack of Transportation (Medical): Not on file    Lack of Transportation (Non-Medical): Not on file   Physical Activity:     Days of Exercise per Week: Not on file    Minutes of Exercise per Session: Not on file   Stress:     Feeling of Stress : Not on file   Social Connections:     Frequency of Communication with Friends and Family: Not on file    Frequency of Social Gatherings with Friends and Family: Not on file    Attends [employer] Services: Not on file    Active Member of [address] [place] [address] [employer] or Organizations: Not on file    Attends Club or Organization Meetings: Not on file    Marital Status: Not on file   Intimate Partner Violence:     Fear of Current or Ex-Partner: Not on file    Emotionally Abused: Not on file    Physically Abused: Not on file    Sexually Abused: Not on file   Housing Stability:     Unable to Pay for Housing in the Last Year: Not on file    Number of [place] in the Last Year: Not on file    Unstable Housing in the Last Year: Not on file     No current facility-administered medications for this encounter. Current Outpatient Medications   Medication Sig Dispense Refill    methylPREDNISolone (MEDROL DOSEPACK) 4 MG tablet Take by mouth.  1 kit 0    famotidine (PEPCID) 20 MG tablet Take 1 tablet by mouth 2 times daily for 7 days 14 tablet 0    nicotine (NICODERM CQ) 21 MG/24HR       VENTOLIN  (90 Base) MCG/ACT inhaler       buPROPion (WELLBUTRIN XL) 150 MG extended release tablet       cyclobenzaprine (FLEXERIL) 5 MG tablet       ADVAIR DISKUS 250-50 MCG/ACT AEPB diskus inhaler       ondansetron (ZOFRAN-ODT) 4 MG disintegrating tablet Take 1 tablet by mouth 3 times daily as needed for Nausea or Vomiting 21 tablet 0    sulfamethoxazole-trimethoprim (BACTRIM DS) 800-160 MG per tablet Take 1 tablet by mouth 2 times daily      HYDROcodone-acetaminophen (NORCO) 5-325 MG per tablet Take 1 tablet by mouth every 6 hours as needed for Pain 6 tablet 0     Allergies   Allergen Reactions    Toradol [Ketorolac Tromethamine] Other (See Comments)     \"restless legs\"       REVIEW OF SYSTEMS  10 systems reviewed, pertinent positives per HPI otherwise noted to be negative. PHYSICAL EXAM  /81   Pulse 76   Temp 97.1 °F (36.2 °C) (Tympanic)   Resp 16   Ht 5' 5\" (1.651 m)   Wt 250 lb (113.4 kg)   LMP 04/15/2022 (Approximate)   SpO2 98%   BMI 41.60 kg/m²    GENERAL APPEARANCE: Awake and alert. Cooperative. No acute distress. HENT: Normocephalic. Atraumatic. Mucous membranes are moist.  No drooling or stridor. No posterior oropharyngeal erythema or exudates. No tongue or lip swelling. Uvula midline and nonedematous. No unilateral swelling or fullness of tonsillar pillars. NECK: Supple. No cervical lymphadenopathy or neck swelling. EYES: PERRL. EOM's grossly intact. HEART/CHEST: RRR. No murmurs. 2+ radial pulses b/l. LUNGS: Respirations unlabored. CTAB. No wheezes, rales or rhonchi. Good air exchange. Speaking comfortably in full sentences. ABDOMEN: No tenderness. Soft. Non-distended. No masses. No organomegaly. No guarding or rebound. MUSCULOSKELETAL: No extremity edema. Compartments soft. No deformity. No tenderness in the extremities.   All extremities neurovascularly intact. SKIN: Warm and dry. Diffuse urticaria especially to low back and buttock area, and b/l UE. No facial rash. NEUROLOGICAL: Alert and oriented. CN's 2-12 intact. No gross facial drooping. No gross focal deficits. PSYCHIATRIC: Normal mood and affect. LABS  I have reviewed all labs for this visit. No results found for this visit on 05/10/22. RADIOLOGY  None     ED COURSE/MDM  Patient seen and evaluated. Old records reviewed. Patient presenting for evaluation of urticaria, unclear trigger. She felt like she had a lump in her throat but no evidence of airway compromise or any difficulty swallowing. IV Solu-Medrol and Pepcid given as she had already taken Benadryl at home already as well. Patient was observed without any further worsening symptoms or evidence of impending airway involvement or compromise. I felt that was reasonable to discharge patient home with close outpatient follow-up as well as continuation of steroids, H1 and H2 blocker. Patient verbalized understanding and agreement of that plan and all questions answered at time of discharge. I estimate there is LOW risk for AIRWAY COMPROMISE, ANAPHYLAXIS, CELLULITIS, EPIGLOTTITIS, or NECROTIZING FASCIITIS, thus I consider the discharge disposition reasonable. Also, there is no evidence or peritonitis, sepsis, or toxicity. Yoshi Lind and I have discussed the diagnosis and risks, and we agree with discharging home to follow-up with their primary doctor. We also discussed returning to the Emergency Department immediately if new or worsening symptoms occur. We have discussed the symptoms which are most concerning (e.g., bloody stool, fever, changing or worsening pain, vomiting) that necessitate immediate return. Is this patient to be included in the SEP-1 Core Measure? No   Exclusion criteria - the patient is NOT to be included for SEP-1 Core Measure due to:   Infection is not suspected     During the patient's ED course, the patient was given:  Medications   methylPREDNISolone sodium (SOLU-MEDROL) injection 125 mg (125 mg IntraVENous Given 5/10/22 1047)   famotidine (PEPCID) 20 mg in sodium chloride (PF) 10 mL injection (20 mg IntraVENous Given 5/10/22 1047)        CLINICAL IMPRESSION  1. Urticaria        Blood pressure 118/81, pulse 76, temperature 97.1 °F (36.2 °C), temperature source Tympanic, resp. rate 16, height 5' 5\" (1.651 m), weight 250 lb (113.4 kg), last menstrual period 04/15/2022, SpO2 98 %, not currently breastfeeding. Elsa Jamil was discharged to home in stable condition. Patient was given scripts for the following medications. I counseled patient how to take these medications. Discharge Medication List as of 5/10/2022 12:29 PM      START taking these medications    Details   methylPREDNISolone (MEDROL DOSEPACK) 4 MG tablet Take by mouth., Disp-1 kit, R-0Normal      famotidine (PEPCID) 20 MG tablet Take 1 tablet by mouth 2 times daily for 7 days, Disp-14 tablet, R-0Normal             Follow-up with:  Ary Shea, LITO - CNP  [address] [address] [address]    Schedule an appointment as soon as possible for a visit in 2 days  For recheck      DISCLAIMER: This chart was created using [address] [place] [address] dictation software. Efforts were made by me to ensure accuracy, however some errors may be present due to limitations of this technology and occasionally words are not transcribed correctly.         Mahi Motley MD  05/10/22 0637

## 2025-02-27 ENCOUNTER — HOSPITAL ENCOUNTER (EMERGENCY)
Age: 45
Discharge: HOME | End: 2025-02-28
Attending: STUDENT IN AN ORGANIZED HEALTH CARE EDUCATION/TRAINING PROGRAM
Payer: COMMERCIAL

## 2025-02-27 DIAGNOSIS — S05.01XA ABRASION OF RIGHT CORNEA, INITIAL ENCOUNTER: Primary | ICD-10-CM

## 2025-02-27 PROCEDURE — 99284 EMERGENCY DEPT VISIT MOD MDM: CPT

## 2025-02-27 ASSESSMENT — LIFESTYLE VARIABLES
HOW OFTEN DO YOU HAVE A DRINK CONTAINING ALCOHOL: NEVER
HOW MANY STANDARD DRINKS CONTAINING ALCOHOL DO YOU HAVE ON A TYPICAL DAY: PATIENT DOES NOT DRINK

## 2025-02-27 ASSESSMENT — PAIN DESCRIPTION - FREQUENCY: FREQUENCY: CONTINUOUS

## 2025-02-27 ASSESSMENT — PAIN DESCRIPTION - ORIENTATION: ORIENTATION: RIGHT

## 2025-02-27 ASSESSMENT — PAIN SCALES - GENERAL: PAINLEVEL_OUTOF10: 8

## 2025-02-27 ASSESSMENT — PAIN DESCRIPTION - DESCRIPTORS: DESCRIPTORS: STABBING;BURNING

## 2025-02-27 ASSESSMENT — PAIN - FUNCTIONAL ASSESSMENT: PAIN_FUNCTIONAL_ASSESSMENT: 0-10

## 2025-02-27 ASSESSMENT — PAIN DESCRIPTION - LOCATION: LOCATION: EYE

## 2025-02-28 VITALS
SYSTOLIC BLOOD PRESSURE: 130 MMHG | WEIGHT: 183 LBS | DIASTOLIC BLOOD PRESSURE: 95 MMHG | OXYGEN SATURATION: 98 % | TEMPERATURE: 97.7 F | HEIGHT: 65 IN | HEART RATE: 89 BPM | BODY MASS INDEX: 30.49 KG/M2 | RESPIRATION RATE: 16 BRPM

## 2025-02-28 PROCEDURE — 90471 IMMUNIZATION ADMIN: CPT | Performed by: STUDENT IN AN ORGANIZED HEALTH CARE EDUCATION/TRAINING PROGRAM

## 2025-02-28 PROCEDURE — 6370000000 HC RX 637 (ALT 250 FOR IP): Performed by: STUDENT IN AN ORGANIZED HEALTH CARE EDUCATION/TRAINING PROGRAM

## 2025-02-28 PROCEDURE — 90715 TDAP VACCINE 7 YRS/> IM: CPT | Performed by: STUDENT IN AN ORGANIZED HEALTH CARE EDUCATION/TRAINING PROGRAM

## 2025-02-28 PROCEDURE — 6360000002 HC RX W HCPCS: Performed by: STUDENT IN AN ORGANIZED HEALTH CARE EDUCATION/TRAINING PROGRAM

## 2025-02-28 RX ORDER — IBUPROFEN 800 MG/1
800 TABLET, FILM COATED ORAL EVERY 8 HOURS PRN
Qty: 30 TABLET | Refills: 0 | Status: SHIPPED | OUTPATIENT
Start: 2025-02-28

## 2025-02-28 RX ORDER — IBUPROFEN 800 MG/1
800 TABLET, FILM COATED ORAL ONCE
Status: COMPLETED | OUTPATIENT
Start: 2025-02-28 | End: 2025-02-28

## 2025-02-28 RX ORDER — TETRACAINE HYDROCHLORIDE 5 MG/ML
1 SOLUTION OPHTHALMIC ONCE
Status: COMPLETED | OUTPATIENT
Start: 2025-02-28 | End: 2025-02-28

## 2025-02-28 RX ORDER — CIPROFLOXACIN HYDROCHLORIDE 3.5 MG/ML
1 SOLUTION/ DROPS TOPICAL
Status: DISCONTINUED | OUTPATIENT
Start: 2025-02-28 | End: 2025-02-28 | Stop reason: HOSPADM

## 2025-02-28 RX ADMIN — CIPROFLOXACIN 1 DROP: 3 SOLUTION OPHTHALMIC at 01:44

## 2025-02-28 RX ADMIN — IBUPROFEN 800 MG: 800 TABLET, FILM COATED ORAL at 01:31

## 2025-02-28 RX ADMIN — TETANUS TOXOID, REDUCED DIPHTHERIA TOXOID AND ACELLULAR PERTUSSIS VACCINE, ADSORBED 0.5 ML: 5; 2.5; 8; 8; 2.5 SUSPENSION INTRAMUSCULAR at 00:37

## 2025-02-28 RX ADMIN — TETRACAINE HYDROCHLORIDE 1 DROP: 5 SOLUTION OPHTHALMIC at 00:37

## 2025-02-28 ASSESSMENT — VISUAL ACUITY
OS: 20/25
OD: 20/70

## 2025-02-28 NOTE — ED PROVIDER NOTES
[facility] EMERGENCY DEPARTMENT     EMERGENCY DEPARTMENT ENCOUNTER         Pt Name: Karyn Pena   MRN: [id number]   Birthdate 1980   Date of evaluation: 2025   Provider: Herminio Camacho MD   PCP: Zenobia Tirado APRN - CNP   Note Started: 12:58 AM EST 25       Chief Complaint     Eye Pain (Came to ED due to Eye pain, pt stated that shwe feels that something is inside her RT eye, pain started at 6 pm today)      History of Present Illness     Karyn Pena is a 44 y.o. female who presents with right eye pain with foreign body sensation.  Believes she has something in the eye or perhaps scratched her eye as of 6 PM today.  Does not wear contacts no other acute complaints.      Review of Systems     Positives and pertinent negatives as per HPI.    Past Medical, Surgical, Family, and Social History     She has a past medical history of Anxiety, Bile leak, GERD (gastroesophageal reflux disease), Hypoxemia, Leg pain, bilateral, Migraine, Overweight, and Pleural effusion.  She has a past surgical history that includes  section; Tubal ligation; [place] tooth extraction; other surgical history (2014); Upper gastrointestinal endoscopy (N/A, 3/3/2023); and Sleeve Gastrectomy (N/A, 7/10/2023).  Her family history is not on file.  She reports that she quit smoking about 23 months ago. Her smoking use included cigarettes. She started smoking about 20 years ago. She has a 28.5 pack-year smoking history. She has never used smokeless tobacco. She reports that she does not currently use alcohol. She reports that she does not use drugs.    SCREENINGS:          Michelle Coma Scale  Eye Opening: Spontaneous  Best Verbal Response: Oriented  Best Motor Response: Obeys commands  Michelle Coma Scale Score: 15                        CIWA Assessment  BP: (!) 130/95  Pulse: 89               Medications     Discharge Medication List as of 2025  1:41 AM        CONTINUE these medications which  have NOT CHANGED    Details   !! ibuprofen (ADVIL;MOTRIN) 600 MG tablet Take 1 tablet by mouth 3 times daily as needed for Pain, Disp-30 tablet, R-0Normal      acetaminophen (TYLENOL) 500 MG tablet Take 2 tablets by mouth every 8 hours as needed for Pain, Disp-60 tablet, R-0Normal      ondansetron (ZOFRAN-ODT) 8 MG TBDP disintegrating tablet Place 1 tablet under the tongue every 8 hours as needed for Nausea or Vomiting, Disp-30 tablet, R-1Normal      pantoprazole (PROTONIX) 40 MG tablet Take 1 tablet by mouth daily, Disp-30 tablet, R-3Normal      folic acid (V-R FOLIC ACID) 400 MCG tablet Take 1 tablet by mouth daily, Disp-30 tablet, R-3Normal      ferrous sulfate (IRON 325) 325 (65 Fe) MG tablet Take 1 tablet by mouth 2 times daily (with meals), Disp-60 tablet, R-3Normal      Cholecalciferol 50 MCG (2000 UT) TABS Take 1 tablet by mouth daily Take 1 tablet by mouth daily. Start this medication after you finish the weekly regimen, Disp-90 tablet, R-2Normal      vitamin D (CHOLECALCIFEROL) [address]4742 UNIT CAPS Take 1 capsule by mouth once a week, Disp-12 capsule, R-0Normal      Buprenorphine HCl (SUBUTEX SL) Place 12 mg under the tongue 2 times dailyHistorical Med      EPINEPHrine (EPIPEN 2-LARRY) 0.3 MG/0.3ML SOAJ injection Inject 0.3 mLs into the muscle once for 1 dose Use as directed for allergic reaction, Disp-0.3 mL, R-0Normal       !! - Potential duplicate medications found. Please discuss with provider.          Allergies     She is allergic to toradol [ketorolac tromethamine].    Physical Exam     INITIAL VITALS: BP: (!) 135/101, Temp: 97.7 °F (36.5 °C), Pulse: 96, Respirations: 16, SpO2: 100 %     General: alert and conversant in no distress  Skin: warm, dry and pink  Head: normocephalic atraumatic  Eyes: pupils equal, extra ocular movements intact  Neck: normal range of motion without pain  Extremities: warm and well perfused, no significant edema    Additional Exams:    Visual acuity:     Visual Acuity  R

## 2025-02-28 NOTE — DISCHARGE INSTRUCTIONS
You were evaluated in the emergency department for corneal abrasion. Assessments and testing completed during your visit were reassuring and at this time there is no indication for further testing, treatment or admission to the hospital. Given this it is appropriate to discharge you from the emergency department. At the time of discharge we discussed the following:    Please continue antibiotics eye drops every 4 hours while awake for next 3 days as precaution against infection. Use the numbing eye drop as we discussed no longer than 28 hours (1 drop every 4 hours) and ibuprofen for pain and follow up to eye doctors per referral in next one week for repeat evaluation. Please return with any new or worsening symptoms      Please note that sometimes it is difficult to diagnose a medical condition early in the disease process before the disease is fully manifest. Because of this, should you develop any new or worsening symptoms, you may return at any time to the emergency department for another evaluation. If available you are also recommended to review this visit with your primary care physician or other medical provider in the next 7 days. Thank you for allowing us to care for you today.

## (undated) DEVICE — SOLUTION IV IRRIG WATER 500ML POUR BRL ST 2F7113

## (undated) DEVICE — BW-412T DISP COMBO CLEANING BRUSH: Brand: SINGLE USE COMBINATION CLEANING BRUSH

## (undated) DEVICE — BOWL MED L 32OZ PLAS W/ MOLD GRAD EZ OPN PEEL PCH

## (undated) DEVICE — GLOVE ORANGE PI 8 1/2   MSG9085

## (undated) DEVICE — LIQUIBAND RAPID ADHESIVE 36/CS 0.8ML: Brand: MEDLINE

## (undated) DEVICE — VALVE SUCTION AIR H2O SET ORCA POD + DISP

## (undated) DEVICE — TROCAR: Brand: KII FIOS FIRST ENTRY

## (undated) DEVICE — TROCARS: Brand: KII® OPTICAL ACCESS SYSTEM

## (undated) DEVICE — AIR/WATER CLEANING ADAPTER FOR OLYMPUS® GI ENDOSCOPE: Brand: BULLDOG®

## (undated) DEVICE — RELOAD STPL L60MM H1-2.6MM MESENTERY THN TISS WHT 6 ROW

## (undated) DEVICE — SHEET,DRAPE,40X58,STERILE: Brand: MEDLINE

## (undated) DEVICE — CRADLE ANK AND FT ELEV FLAT END POLY FOAM W/ VENT H NEUT

## (undated) DEVICE — DEVICE SUT SHFT L34CM DIA 10MM 2 JAW LD UNIT ENDOSTCH

## (undated) DEVICE — Device

## (undated) DEVICE — NEEDLE INSUF L150MM DIA2MM DISP FOR PNEUMOPERI ENDOPATH

## (undated) DEVICE — GOWN,AURORA,NONREINF,RAGLAN,XXL,STERILE: Brand: MEDLINE

## (undated) DEVICE — STAPLER 60MM POWERED ECHELON 3000 LONG 440MM

## (undated) DEVICE — SUTURE VCRL PLUS SZ 0 54IN TIE VCP608H

## (undated) DEVICE — FORCEPS BX L240CM WRK CHN 2.8MM STD CAP W/ NDL MIC MESH

## (undated) DEVICE — LAPAROSCOPIC SCISSORS: Brand: EPIX LAPAROSCOPIC SCISSORS

## (undated) DEVICE — TUBING, SUCTION, 1/4" X 10', STRAIGHT: Brand: MEDLINE

## (undated) DEVICE — SOLUTION IRRIG 1000ML 0.9% SOD CHL USP POUR PLAS BTL

## (undated) DEVICE — SHEARS ENDOSCP HARM 36CM ULTRASONIC CRV TIP UPGRD

## (undated) DEVICE — LAPAROSCOPY PACK: Brand: MEDLINE INDUSTRIES, INC.

## (undated) DEVICE — DRAPE,LAP,CHOLE,W/TROUGHS,STERILE: Brand: MEDLINE

## (undated) DEVICE — SPONGE,LAP,4"X18",XR,ST,5/PK,40PK/CS: Brand: MEDLINE INDUSTRIES, INC.

## (undated) DEVICE — TRUE CONTENT TO BE POPULATED AS PART OF REBRANDING: Brand: ARGYLE

## (undated) DEVICE — LOTION PREP REMV 5OZ IODO CLR TINC OF BENZ DURAPREP

## (undated) DEVICE — AIR SHEET,LAT,COMFORT GLIDE, BLEND 40X80: Brand: MEDLINE

## (undated) DEVICE — SYRINGE MED 10ML TRNSLUC BRL PLUNG BLK MRK POLYPR CTRL

## (undated) DEVICE — SHEET,DRAPE,53X77,STERILE: Brand: MEDLINE

## (undated) DEVICE — ENDOSCOPIC KIT 6X3/16 FT COLON W/ 1.1 OZ 2 GWN W/O BRSH

## (undated) DEVICE — MERCY FAIRFIELD TURNOVER KIT: Brand: MEDLINE INDUSTRIES, INC.

## (undated) DEVICE — 30978 SEE SHARP - ENHANCED INTRAOPERATIVE LAPAROSCOPE CLEANING & DEFOGGING: Brand: 30978 SEE SHARP - ENHANCED INTRAOPERATIVE LAPAROSCOPE CLEANING & DEFOGGING

## (undated) DEVICE — APPLICATOR PREP 26ML 0.7% IOD POVACRYLEX 74% ISO ALC ST

## (undated) DEVICE — PASSIVE LAPSCP FLTR W/ 1/4INX24 TBNG AND M LUER LCK FIT - U

## (undated) DEVICE — BLANKET WRM W29.9XL79.1IN UP BODY FORC AIR MISTRAL-AIR

## (undated) DEVICE — RELOAD STPL L60MM H1.5-3.6MM REG TISS BLU GRIPPING SURF B

## (undated) DEVICE — MOUTHPIECE ENDOSCP L CTRL OPN AND SIDE PORTS DISP

## (undated) DEVICE — SUTURE VCRL + SZ 4-0 L18IN ABSRB UD L19MM PS-2 3/8 CIR PRIM VCP496H

## (undated) DEVICE — ARM CRADLE: Brand: DEVON

## (undated) DEVICE — DEVICE SUT W/ SZ 0 L48IN VLT POLYSRB SUT DISP ES-9 ENDO

## (undated) DEVICE — TROCAR: Brand: KII OPTICAL ACCESS SYSTEM

## (undated) DEVICE — NEEDLE,22GX1.5",REG,BEVEL: Brand: MEDLINE